# Patient Record
Sex: FEMALE | Race: BLACK OR AFRICAN AMERICAN | Employment: FULL TIME | URBAN - METROPOLITAN AREA
[De-identification: names, ages, dates, MRNs, and addresses within clinical notes are randomized per-mention and may not be internally consistent; named-entity substitution may affect disease eponyms.]

---

## 2018-01-19 ENCOUNTER — HOSPITAL ENCOUNTER (OUTPATIENT)
Dept: MAMMOGRAPHY | Age: 48
Discharge: HOME | End: 2018-01-19
Attending: FAMILY MEDICINE
Payer: COMMERCIAL

## 2018-01-19 DIAGNOSIS — Z12.31 VISIT FOR SCREENING MAMMOGRAM: ICD-10-CM

## 2018-01-19 PROCEDURE — 77067 SCR MAMMO BI INCL CAD: CPT

## 2018-07-31 ENCOUNTER — HOSPITAL ENCOUNTER (OUTPATIENT)
Dept: LAB | Age: 48
Discharge: HOME | End: 2018-07-31
Payer: COMMERCIAL

## 2018-07-31 ENCOUNTER — OFFICE VISIT (OUTPATIENT)
Dept: FAMILY MEDICINE CLINIC | Age: 48
End: 2018-07-31

## 2018-07-31 VITALS
DIASTOLIC BLOOD PRESSURE: 76 MMHG | TEMPERATURE: 97.8 F | OXYGEN SATURATION: 98 % | WEIGHT: 239.2 LBS | HEIGHT: 62 IN | SYSTOLIC BLOOD PRESSURE: 116 MMHG | HEART RATE: 71 BPM | RESPIRATION RATE: 12 BRPM | BODY MASS INDEX: 44.02 KG/M2

## 2018-07-31 DIAGNOSIS — I10 ESSENTIAL HYPERTENSION: ICD-10-CM

## 2018-07-31 DIAGNOSIS — L30.9 ECZEMA, UNSPECIFIED TYPE: ICD-10-CM

## 2018-07-31 DIAGNOSIS — Z12.4 SCREENING FOR CERVICAL CANCER: ICD-10-CM

## 2018-07-31 DIAGNOSIS — L65.9 HAIR LOSS: ICD-10-CM

## 2018-07-31 DIAGNOSIS — M25.572 CHRONIC PAIN OF BOTH ANKLES: ICD-10-CM

## 2018-07-31 DIAGNOSIS — Z91.09 ENVIRONMENTAL ALLERGIES: ICD-10-CM

## 2018-07-31 DIAGNOSIS — R60.0 BILATERAL LOWER EXTREMITY EDEMA: ICD-10-CM

## 2018-07-31 DIAGNOSIS — E66.01 MORBID OBESITY WITH BMI OF 40.0-44.9, ADULT (HCC): ICD-10-CM

## 2018-07-31 DIAGNOSIS — I10 ESSENTIAL HYPERTENSION: Primary | ICD-10-CM

## 2018-07-31 DIAGNOSIS — D64.9 MILD ANEMIA: ICD-10-CM

## 2018-07-31 DIAGNOSIS — M25.571 CHRONIC PAIN OF BOTH ANKLES: ICD-10-CM

## 2018-07-31 DIAGNOSIS — Z13.31 POSITIVE DEPRESSION SCREENING: ICD-10-CM

## 2018-07-31 DIAGNOSIS — G89.29 CHRONIC PAIN OF BOTH ANKLES: ICD-10-CM

## 2018-07-31 LAB
ALBUMIN SERPL-MCNC: 3.6 G/DL (ref 3.4–5)
ALBUMIN/GLOB SERPL: 1 {RATIO} (ref 0.8–1.7)
ALP SERPL-CCNC: 65 U/L (ref 45–117)
ALT SERPL-CCNC: 30 U/L (ref 13–56)
ANION GAP SERPL CALC-SCNC: 7 MMOL/L (ref 3–18)
AST SERPL-CCNC: 41 U/L (ref 15–37)
BASOPHILS # BLD: 0 K/UL (ref 0–0.1)
BASOPHILS NFR BLD: 1 % (ref 0–2)
BILIRUB SERPL-MCNC: 0.2 MG/DL (ref 0.2–1)
BUN SERPL-MCNC: 12 MG/DL (ref 7–18)
BUN/CREAT SERPL: 16 (ref 12–20)
CALCIUM SERPL-MCNC: 8.4 MG/DL (ref 8.5–10.1)
CHLORIDE SERPL-SCNC: 106 MMOL/L (ref 100–108)
CHOLEST SERPL-MCNC: 200 MG/DL
CO2 SERPL-SCNC: 28 MMOL/L (ref 21–32)
CREAT SERPL-MCNC: 0.73 MG/DL (ref 0.6–1.3)
DIFFERENTIAL METHOD BLD: ABNORMAL
EOSINOPHIL # BLD: 0.2 K/UL (ref 0–0.4)
EOSINOPHIL NFR BLD: 5 % (ref 0–5)
ERYTHROCYTE [DISTWIDTH] IN BLOOD BY AUTOMATED COUNT: 13.9 % (ref 11.6–14.5)
FERRITIN SERPL-MCNC: 20 NG/ML (ref 8–388)
FOLATE SERPL-MCNC: 6.2 NG/ML (ref 3.1–17.5)
GLOBULIN SER CALC-MCNC: 3.7 G/DL (ref 2–4)
GLUCOSE SERPL-MCNC: 95 MG/DL (ref 74–99)
HBA1C MFR BLD: 5.8 % (ref 4.2–5.6)
HCT VFR BLD AUTO: 36.5 % (ref 35–45)
HDLC SERPL-MCNC: 57 MG/DL (ref 40–60)
HDLC SERPL: 3.5 {RATIO} (ref 0–5)
HGB BLD-MCNC: 12 G/DL (ref 12–16)
IRON SATN MFR SERPL: 9 %
IRON SERPL-MCNC: 37 UG/DL (ref 50–175)
LDLC SERPL CALC-MCNC: 126.4 MG/DL (ref 0–100)
LIPID PROFILE,FLP: ABNORMAL
LYMPHOCYTES # BLD: 1.6 K/UL (ref 0.9–3.6)
LYMPHOCYTES NFR BLD: 40 % (ref 21–52)
MCH RBC QN AUTO: 27.5 PG (ref 24–34)
MCHC RBC AUTO-ENTMCNC: 32.9 G/DL (ref 31–37)
MCV RBC AUTO: 83.5 FL (ref 74–97)
MONOCYTES # BLD: 0.5 K/UL (ref 0.05–1.2)
MONOCYTES NFR BLD: 12 % (ref 3–10)
NEUTS SEG # BLD: 1.7 K/UL (ref 1.8–8)
NEUTS SEG NFR BLD: 42 % (ref 40–73)
PLATELET # BLD AUTO: 228 K/UL (ref 135–420)
PMV BLD AUTO: 12.1 FL (ref 9.2–11.8)
POTASSIUM SERPL-SCNC: 3.9 MMOL/L (ref 3.5–5.5)
PROT SERPL-MCNC: 7.3 G/DL (ref 6.4–8.2)
RBC # BLD AUTO: 4.37 M/UL (ref 4.2–5.3)
SODIUM SERPL-SCNC: 141 MMOL/L (ref 136–145)
TIBC SERPL-MCNC: 402 UG/DL (ref 250–450)
TRIGL SERPL-MCNC: 83 MG/DL (ref ?–150)
TSH SERPL DL<=0.05 MIU/L-ACNC: 1.9 UIU/ML (ref 0.36–3.74)
VIT B12 SERPL-MCNC: 866 PG/ML (ref 211–911)
VLDLC SERPL CALC-MCNC: 16.6 MG/DL
WBC # BLD AUTO: 4 K/UL (ref 4.6–13.2)

## 2018-07-31 PROCEDURE — 82607 VITAMIN B-12: CPT | Performed by: PHYSICIAN ASSISTANT

## 2018-07-31 PROCEDURE — 83036 HEMOGLOBIN GLYCOSYLATED A1C: CPT | Performed by: PHYSICIAN ASSISTANT

## 2018-07-31 PROCEDURE — 80053 COMPREHEN METABOLIC PANEL: CPT | Performed by: PHYSICIAN ASSISTANT

## 2018-07-31 PROCEDURE — 36415 COLL VENOUS BLD VENIPUNCTURE: CPT | Performed by: PHYSICIAN ASSISTANT

## 2018-07-31 PROCEDURE — 83540 ASSAY OF IRON: CPT | Performed by: PHYSICIAN ASSISTANT

## 2018-07-31 PROCEDURE — 80061 LIPID PANEL: CPT | Performed by: PHYSICIAN ASSISTANT

## 2018-07-31 PROCEDURE — 82728 ASSAY OF FERRITIN: CPT | Performed by: PHYSICIAN ASSISTANT

## 2018-07-31 PROCEDURE — 85025 COMPLETE CBC W/AUTO DIFF WBC: CPT | Performed by: PHYSICIAN ASSISTANT

## 2018-07-31 PROCEDURE — 84443 ASSAY THYROID STIM HORMONE: CPT | Performed by: PHYSICIAN ASSISTANT

## 2018-07-31 RX ORDER — LEVOCETIRIZINE DIHYDROCHLORIDE 5 MG/1
TABLET, FILM COATED ORAL
COMMUNITY
End: 2018-10-12

## 2018-07-31 RX ORDER — MONTELUKAST SODIUM 10 MG/1
10 TABLET ORAL DAILY
COMMUNITY
End: 2020-03-14

## 2018-07-31 RX ORDER — AMLODIPINE BESYLATE 10 MG/1
10 TABLET ORAL DAILY
COMMUNITY
Start: 2018-05-21 | End: 2018-08-02 | Stop reason: SINTOL

## 2018-07-31 RX ORDER — HYDROCORTISONE VALERATE 2 MG/G
CREAM TOPICAL DAILY
COMMUNITY
Start: 2017-08-11 | End: 2018-10-12

## 2018-07-31 RX ORDER — AZELASTINE HYDROCHLORIDE 0.5 MG/ML
SOLUTION/ DROPS OPHTHALMIC 2 TIMES DAILY
COMMUNITY
Start: 2018-05-21 | End: 2018-12-18 | Stop reason: SDUPTHER

## 2018-07-31 RX ORDER — TRIAMCINOLONE ACETONIDE 1 MG/G
CREAM TOPICAL DAILY
COMMUNITY
Start: 2017-09-05 | End: 2020-03-14

## 2018-07-31 NOTE — MR AVS SNAPSHOT
[facility] [address] 707 [facility] 
[phone] Patient: Ayesha Chavez MRN: [id number] IQI:[id number] Visit Information Date & Time Provider Department Dept. Phone Encounter #  
 7/31/2018  9:00 AM Marichuy Marvin, [address] [place] [address] [id number] Follow-up Instructions Return in about 3 months (around 10/31/2018) for Routine Care. Upcoming Health Maintenance Date Due DTaP/Tdap/Td series (1 - Tdap) 4/8/1991 PAP AKA CERVICAL CYTOLOGY 4/8/1991 Influenza Age 5 to Adult 8/1/2018 Allergies as of 7/31/2018  Review Complete On: 7/31/2018 By: SAHARA Zaman Severity Noted Reaction Type Reactions Latex  06/25/2014   Topical Hives Current Immunizations  Never Reviewed No immunizations on file. Not reviewed this visit You Were Diagnosed With   
  
 Codes Comments Essential hypertension    -  Primary ICD-10-CM: I10 
ICD-9-CM: 401.9 Environmental allergies     ICD-10-CM: Z91.09 
ICD-9-CM: V15.09 Eczema, unspecified type     ICD-10-CM: L30.9 ICD-9-CM: 692.9 Mild anemia     ICD-10-CM: D64.9 ICD-9-CM: 285.9 Hair loss     ICD-10-CM: L65.9 ICD-9-CM: 704.00 Bilateral lower extremity edema     ICD-10-CM: R60.0 ICD-9-CM: 745. 3 Chronic pain of both ankles     ICD-10-CM: M25.571, G89.29, M25.572 ICD-9-CM: 719.47, 338.29 Positive depression screening     ICD-10-CM: Z13.89 ICD-9-CM: 796.4 Morbid obesity with BMI of 40.0-44.9, adult (HCC)     ICD-10-CM: E66.01, Z68.41 
ICD-9-CM: 278.01, V85.41 Screening for cervical cancer     ICD-10-CM: Z12.4 ICD-9-CM: V76.2 Vitals BP Pulse Temp Resp Height(growth percentile) Weight(growth percentile) 116/76 (BP 1 Location: Right arm, BP Patient Position: Sitting) 71 97.8 °F (36.6 °C) (Oral) 12 5' 2\" (1.575 m) 239 lb 3.2 oz (108.5 kg) LMP SpO2 BMI OB Status Smoking Status 07/16/2018 98% 43.75 kg/m2 Unknown Never Smoker Vitals History BMI and BSA Data Body Mass Index Body Surface Area 43.75 kg/m 2 2.18 m 2 Preferred Pharmacy Pharmacy Name Phone [id number] Azar, [address] Your Updated Medication List  
  
   
This list is accurate as of 7/31/18 10:00 AM.  Always use your most recent med list.  
  
  
  
  
 ALEVE 220 mg tablet Generic drug:  naproxen sodium Take 220 mg by mouth two (2) times daily (with meals). amLODIPine 10 mg tablet Commonly known as:  Selvin Mend Take 10 mg by mouth daily. azelastine 0.05 % ophthalmic solution Commonly known as:  OPTIVAR  
two (2) times a day. hydrocortisone valerate 0.2 % topical cream  
Commonly known as:  WESTCORT  
daily. levocetirizine 5 mg tablet Commonly known as:  Jorene Ashing Take  by mouth. SINGULAIR 10 mg tablet Generic drug:  montelukast  
Take 10 mg by mouth daily. triamcinolone acetonide 0.1 % topical cream  
Commonly known as:  KENALOG Apply  to affected area daily. We Performed the Following REFERRAL TO OBSTETRICS AND GYNECOLOGY [REF51 Custom] REFERRAL TO ORTHOPEDICS [FAL784 Custom] Follow-up Instructions Return in about 3 months (around 10/31/2018) for Routine Care. To-Do List   
 07/31/2018 Lab:  HEMOGLOBIN A1C W/O EAG   
  
 07/31/2018 Lab:  IRON PROFILE   
  
 07/31/2018 Lab:  METABOLIC PANEL, COMPREHENSIVE   
  
 07/31/2018 Lab:  VITAMIN B12 & FOLATE Around 08/01/2018 Lab:  CBC WITH AUTOMATED DIFF Around 08/01/2018 Lab:  FERRITIN Around 08/01/2018 Lab:  LIPID PANEL Around 08/01/2018 Lab:  TSH 3RD GENERATION Referral Information Referral ID Referred By Referred To  
  
 [id number] Mook,  [facility], [address] [place] [address] Dr Tong [address] [facility], [address] Phone: [phone] Fax: [phone] Visits Status Start Date End Date 1 New Request 7/31/18 7/31/19 If your referral has a status of pending review or denied, additional information will be sent to support the outcome of this decision. Referral ID Referred By Referred To  
 [id number] [address], [address], [address] [place], [address] Phone: [phone] Fax: [phone] Visits Status Start Date End Date 1 New Request 7/31/18 7/31/19 If your referral has a status of pending review or denied, additional information will be sent to support the outcome of this decision. Patient Instructions A Healthy Lifestyle: Care Instructions Your Care Instructions A healthy lifestyle can help you feel good, stay at a healthy weight, and have plenty of energy for both work and play. A healthy lifestyle is something you can share with your whole family. A healthy lifestyle also can lower your risk for serious health problems, such as high blood pressure, heart disease, and diabetes. You can follow a few steps listed below to improve your health and the health of your family. Follow-up care is a key part of your treatment and safety. Be sure to make and go to all appointments, and call your doctor if you are having problems. It's also a good idea to know your test results and keep a list of the medicines you take. How can you care for yourself at home? · Do not eat too much sugar, fat, or fast foods. You can still have dessert and treats now and then. The goal is moderation. · Start small to improve your eating habits. Pay attention to portion sizes, drink less juice and soda pop, and eat more fruits and vegetables. ¨ Eat a healthy amount of food. A 3-ounce serving of meat, for example, is about the size of a deck of cards. Fill the rest of your plate with vegetables and whole grains. ¨ Limit the amount of soda and sports drinks you have every day. Drink more water when you are thirsty. ¨ Eat at least 5 servings of fruits and vegetables every day. It may seem like a lot, but it is not hard to reach this goal. A serving or helping is 1 piece of fruit, 1 cup of vegetables, or 2 cups of leafy, raw vegetables. Have an apple or some carrot sticks as an afternoon snack instead of a candy bar. Try to have fruits and/or vegetables at every meal. 
· Make exercise part of your daily routine. You may want to start with simple activities, such as walking, bicycling, or slow swimming. Try to be active 30 to 60 minutes every day. You do not need to do all 30 to 60 minutes all at once. For example, you can exercise 3 times a day for 10 or 20 minutes. Moderate exercise is safe for most people, but it is always a good idea to talk to your doctor before starting an exercise program. 
· Keep moving. Antonio Punter the lawn, work in the garden, or [place]. Take the stairs instead of the elevator at work. · If you smoke, quit. People who smoke have an increased risk for heart attack, stroke, cancer, and other lung illnesses. Quitting is hard, but there are ways to boost your chance of quitting tobacco for good. ¨ Use nicotine gum, patches, or lozenges. ¨ Ask your doctor about stop-smoking programs and medicines. ¨ Keep trying. In addition to reducing your risk of diseases in the future, you will notice some benefits soon after you stop using tobacco. If you have shortness of breath or asthma symptoms, they will likely get better within a few weeks after you quit. · Limit how much alcohol you drink. Moderate amounts of alcohol (up to 2 drinks a day for men, 1 drink a day for women) are okay. But drinking too much can lead to liver problems, high blood pressure, and other health problems. Family health If you have a family, there are many things you can do together to improve your health. · Eat meals together as a family as often as possible. · Eat healthy foods. This includes fruits, vegetables, lean meats and dairy, and whole grains. · Include your family in your fitness plan. Most people think of activities such as jogging or tennis as the way to fitness, but there are many ways you and your family can be more active. Anything that makes you breathe hard and gets your heart pumping is exercise. Here are some tips: 
¨ Walk to do errands or to take your child to school or the bus. ¨ Go for a family bike ride after dinner instead of watching TV. Where can you learn more? Go to http://moneQiandaomarshal.info/. Enter T436 in the search box to learn more about \"A Healthy Lifestyle: Care Instructions. \" Current as of: December 7, 2017 Content Version: 11.7 © 0665-3203 [employer]. Care instructions adapted under license by [employer] (which disclaims liability or warranty for this information). If you have questions about a medical condition or this instruction, always ask your healthcare professional. [address] any warranty or liability for your use of this information. Introducing [facility]! Silvana Alex introduces Dishable patient portal. Now you can access parts of your medical record, email your doctor's office, and request medication refills online. 1. In your internet browser, go to [email or web address] 2. Click on the First Time User? Click Here link in the Sign In box. You will see the New Member Sign Up page. 3. Enter your Dishable Access Code exactly as it appears below. You will not need to use this code after youve completed the sign-up process. If you do not sign up before the expiration date, you must request a new code. · Dishable Access Code: [email or web address] Expires: 10/29/2018 10:00 AM 
 
4.  Enter the last four digits of your Social Security Number (xxxx) and Date of Birth (mm/dd/yyyy) as indicated and click Submit. You will be taken to the next sign-up page. 5. Create a [employer] ID. This will be your [employer] login ID and cannot be changed, so think of one that is secure and easy to remember. 6. Create a [employer] password. You can change your password at any time. 7. Enter your Password Reset Question and Answer. This can be used at a later time if you forget your password. 8. Enter your e-mail address. You will receive e-mail notification when new information is available in [address] 9. Click Sign Up. You can now view and download portions of your medical record. 10. Click the Download Summary menu link to download a portable copy of your medical information. If you have questions, please visit the Frequently Asked Questions section of the [employer] website. Remember, [employer] is NOT to be used for urgent needs. For medical emergencies, dial 911. Now available from your iPhone and Android! Please provide this summary of care documentation to your next provider. Your primary care clinician is listed as Avelino Acharya. If you have any questions after today's visit, please call [phone].

## 2018-07-31 NOTE — PROGRESS NOTES
Chief Complaint Patient presents with Arias Establish Care  Leg Swelling 1 month, both legs  Ankle Pain  
  1 month, left leg  Hypertension Visit Vitals  /76 (BP 1 Location: Right arm, BP Patient Position: Sitting)  Pulse 71  Temp 97.8 °F (36.6 °C) (Oral)  Resp 12  Ht 5' 2\" (1.575 m)  Wt 239 lb 3.2 oz (108.5 kg)  SpO2 98%  BMI 43.75 kg/m2 Patient is not fasting. Patient in room # 6.  
 
1. Have you been to the ER, urgent care clinic since your last visit? Hospitalized since your last visit? No 
 
2. Have you seen or consulted any other health care providers outside of the [facility] since your last visit? Include any pap smears or colon screening. No 
 
HM Reviewed. Pap is due. Patient see [facility], Dr. Henry Hall for hair loss, Patient sees provider in [facility]. Last appt 06/2018, Upcominging 09/2018 Flowsheet, Learning needs and PHQ completed. PHQ+

## 2018-07-31 NOTE — PROGRESS NOTES
Patient: Jeff Ferreira MRN: [id number]  SSN: [id number] YOB: 1970  Age: 50 y.o. Sex: female Date of Service: 7/31/2018 Provider: Cheron Cowden, PA Office Location:  
[address], [address] Larry bertrand, [address] Office Phone: [phone] Office Fax: [phone] REASON FOR VISIT:  
Chief Complaint Patient presents with Ste[facility]ane [facility] Care  Leg Swelling 1 month, both legs  Ankle Pain  
  1 month, left leg  Hypertension  Alopecia VITALS:  
Visit Vitals  /76 (BP 1 Location: Right arm, BP Patient Position: Sitting) Comment: manual  
 Pulse 71  Temp 97.8 °F (36.6 °C) (Oral)  Resp 12  Ht 5' 2\" (1.575 m)  Wt 239 lb 3.2 oz (108.5 kg)  LMP 07/16/2018  SpO2 98%  BMI 43.75 kg/m2 MEDICATIONS:  
Current Outpatient Prescriptions Medication Sig Dispense Refill  amLODIPine (NORVASC) 10 mg tablet Take 10 mg by mouth daily.  hydrocortisone valerate (WESTCORT) 0.2 % topical cream daily.  azelastine (OPTIVAR) 0.05 % ophthalmic solution two (2) times a day.  triamcinolone acetonide (KENALOG) 0.1 % topical cream Apply  to affected area daily.  levocetirizine (XYZAL) 5 mg tablet Take  by mouth.  montelukast (SINGULAIR) 10 mg tablet Take 10 mg by mouth daily.  naproxen sodium (ALEVE) 220 mg tablet Take 220 mg by mouth two (2) times daily (with meals). ALLERGIES:  
Allergies Allergen Reactions  Latex Hives MEDICAL/SURGICAL HISTORY: 
Past Medical History:  
Diagnosis Date  Benign essential hypertension  Eczema Past Surgical History:  
Procedure Laterality Date  HX BREAST BIOPSY Left 6/16/2016 LEFT BREAST NEEDLE LOCALIZED BIOPSY performed by Jolanta Lord MD at [address] FAMILY HISTORY: 
Family History Problem Relation Age of Onset  No Known Problems Mother  Hypertension Father  Heart Attack Father SOCIAL HISTORY: 
Social History Substance Use Topics  Smoking status: Never Smoker  Smokeless tobacco: Never Used  Alcohol use 2.4 oz/week 1 Standard drinks or equivalent, 3 Shots of liquor per week Comment: ocassional  
  
 
 
 
HISTORY OF PRESENT ILLNESS:  
Radames Rodriguez is a 50 y.o. female who presents to the office to establish care as a new patient. Chronic Medical Problems - Hypertension -  
Relatively new diagnosis, within the past 6 months Well controlled on amlodipine 10 mg daily. Allergies - Stable on Singulair 10 mg daily and Xyzal 5 mg PRN Eczema - Stable with topical steroid creams PRN Patient does follow with dermatology (Dr. Andres Bill) Acute Concerns - 
 
B/L Leg Swelling - Patient's main reason for visit today is for evaluation of swelling in her b/l lower legs x about 1 month. 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 She notes that swelling definitely worsens with prolonged standing, but is also present when she first wakes up in the morning. She has tried compression stockings, but was unable to tolerate them. Fatigue - Patient reports she is interested in trying B12 shots to improve her energy levels. Her last blood work was done in November and did reveal a mild anemia. Left Ankle Pain - Has seen Dr. Fernanda Sharma in the past for pain in her right ankle, and did very well with cortisone injections. MRI revealed tendinopathy. Now having a similar pain in her left ankle Health Maintenance -  
Previous PCP - Dr. Maury Rucker at [facility] on [address]. Last PE with routine labs - Last pap smear - about 2 years ago, does have hx abnormal paps Last mammogram - 1/19/18, bi-rads 2. Does have history of breast bx benign breast disease/fibroadenoma Last DEXA scan - N/A Last colonoscopy - N/A Last flu shot - Last pneumonia vaccine - Last Tdap booster - REVIEW OF SYSTEMS: 
ROS (see scanned H&P form for complete 12 point ROS) PHYSICAL EXAMINATION: 
Physical Exam  
Constitutional: She is oriented to person, place, and time and well-developed, well-nourished, and in no distress. HENT:  
Head: Normocephalic and atraumatic. Eyes: Conjunctivae are normal. Pupils are equal, round, and reactive to light. Right eye exhibits no discharge. Left eye exhibits no discharge. Neck: Neck supple. No thyromegaly present. Cardiovascular: Normal rate, regular rhythm, normal heart sounds and intact distal pulses. Exam reveals no gallop and no friction rub. No murmur heard. trace pitting edema b/l ankles Pulmonary/Chest: Effort normal and breath sounds normal. She has no wheezes. She has no rales. Lymphadenopathy:  
  She has no cervical adenopathy. Neurological: She is alert and oriented to person, place, and time. Gait normal.  
Skin: Skin is warm and dry. No rash noted. Psychiatric: Mood, memory and affect normal.  
  
 
RESULTS: 
No results found for this visit on 07/31/18. ASSESSMENT/PLAN: 
Diagnoses and all orders for this visit: 1. Essential hypertension - Well controlled on current regimen, but will consider switching to HCTZ if labs are WNL as swelling could be caused by amlodipine Orders:   
-     LIPID PANEL; Future 2. Environmental allergies 
- Stable on current regimen. Med refills not needed today 3. Eczema, unspecified type - Managed by dermatology 4. Mild anemia 
- Noted on labs from 11/2017 in [facility] - Patient is interested in B12 shots, but discussed that it would be important to check a B12 level first 
Orders:   
-     CBC WITH AUTOMATED DIFF; Future 
-     IRON PROFILE; Future -     FERRITIN; Future -     VITAMIN B12 & FOLATE; Future 5. Hair loss - Followed by dermatology - Check TSH Orders:   
-     TSH 3RD GENERATION; Future 6. Bilateral lower extremity edema - Venous stasis vs. side effect of amlodipine - Check renal function and TSH  
- Plan of care to be determined depending on results Orders:   
-     TSH 3RD GENERATION; Future -     METABOLIC PANEL, COMPREHENSIVE; Future 7. Chronic pain of both ankles 
- Has done well with steroid injection in the past. Will refer back to Dr. Alvy Blizzard Orders:   
-     REFERRAL TO ORTHOPEDICS 8. Positive depression screening - Depression screen positive, patient instructed to schedule follow-up visit at this practice. 9. Morbid obesity with BMI of 40.0-44.9, adult ([address] 
- Healthy lifestyle handout included in AVS  
- Weight loss encouraged - Check labs Orders:   
-     LIPID PANEL; Future 
-     HEMOGLOBIN A1C W/O EAG; Future 10. Screening for cervical cancer - Patient prefers to see ob/gyn for well woman exams, so will order a referral for her to schedule at her earliest convenience Orders: [facility] [place] [facility] Follow-up Disposition: 
Return in about 3 months (around 10/31/2018) for Routine Care. Sooner as needed depending on lab resutls. PATIENT CARE TEAM:  
Patient Care Team: 
SAHARA Mckay as PCP - General (Physician Assistant) Gerardo Ulloa MD (Dermatology) SAHARA Mckay  
July 31, 2018 11:38 AM

## 2018-07-31 NOTE — PATIENT INSTRUCTIONS

## 2018-08-02 ENCOUNTER — TELEPHONE (OUTPATIENT)
Dept: FAMILY MEDICINE CLINIC | Age: 48
End: 2018-08-02

## 2018-08-02 RX ORDER — HYDROCHLOROTHIAZIDE 25 MG/1
25 TABLET ORAL DAILY
Qty: 30 TAB | Refills: 2 | Status: SHIPPED | OUTPATIENT
Start: 2018-08-02 | End: 2018-10-12

## 2018-08-02 NOTE — TELEPHONE ENCOUNTER
Attempted to reach patient on home/mobile number to discuss lab results. Received message stating not accepting calls at this time. No option to leave message.

## 2018-08-02 NOTE — TELEPHONE ENCOUNTER
Second attempt to reach patient on home #. Work number is just the main line for [facility].

## 2018-08-02 NOTE — LETTER
8/8/2018 11:56 AM 
 
Ms. Kenya Lai [address] [facility] [address] Dear Ms. Starr Hairston, We have been unable to reach you by phone to notify you of your test results. Please call our office at [phone] and ask to speak with my nurse in order to explain these results to you and advise you of any recommendations.  
 
 
 
Sincerely, 
 
 
SAHARA Can

## 2018-08-02 NOTE — PROGRESS NOTES
Please let patient know that her labs looked good for the most part. Blood counts, kidney, liver & thyroid were all within normal limits. Her B12 levels are normal. I do not see any need for B12 shots at this time. Iron levels are at the low end of normal. She may want to consider an OTC multivitamin that contains both iron and B12 to give her more energy. A1c was mildly elevated, in the pre-diabetic range at 5.8%. Recommend working on weight loss through diet and exercise to prevent progression to diabetes. We can discuss this in greater detail at her follow up. I believe her swelling is most likely a side effect of amlodipine. I am going to switch her BP meds to HCTZ to see if this helps.

## 2018-08-20 ENCOUNTER — TELEPHONE (OUTPATIENT)
Dept: FAMILY MEDICINE CLINIC | Age: 48
End: 2018-08-20

## 2018-08-20 NOTE — TELEPHONE ENCOUNTER
Pt states that she is returning Mindi's call about results. Advised pt that Alisia Lopez was unable to contact pt because her phone message states that it was not accepting calls at this time. Please advise.

## 2018-08-20 NOTE — TELEPHONE ENCOUNTER
Received call. Verified name and . Spoke with patient. Patient notified of results from 2018 note  per Lara SHOEMAKER. Patient understood the reason for call and had no further questions or concerns.

## 2018-10-12 ENCOUNTER — OFFICE VISIT (OUTPATIENT)
Dept: OBGYN CLINIC | Age: 48
End: 2018-10-12

## 2018-10-12 ENCOUNTER — HOSPITAL ENCOUNTER (OUTPATIENT)
Dept: LAB | Age: 48
Discharge: HOME | End: 2018-10-12
Payer: COMMERCIAL

## 2018-10-12 VITALS
WEIGHT: 238 LBS | BODY MASS INDEX: 43.79 KG/M2 | HEART RATE: 77 BPM | TEMPERATURE: 98.4 F | DIASTOLIC BLOOD PRESSURE: 80 MMHG | OXYGEN SATURATION: 100 % | SYSTOLIC BLOOD PRESSURE: 128 MMHG | HEIGHT: 62 IN

## 2018-10-12 DIAGNOSIS — Z01.419 WELL WOMAN EXAM WITH ROUTINE GYNECOLOGICAL EXAM: Primary | ICD-10-CM

## 2018-10-12 DIAGNOSIS — Z01.419 WELL WOMAN EXAM WITH ROUTINE GYNECOLOGICAL EXAM: ICD-10-CM

## 2018-10-12 DIAGNOSIS — E66.01 MORBID OBESITY WITH BMI OF 40.0-44.9, ADULT (HCC): ICD-10-CM

## 2018-10-12 DIAGNOSIS — I10 ESSENTIAL HYPERTENSION: ICD-10-CM

## 2018-10-12 DIAGNOSIS — N94.6 DYSMENORRHEA: ICD-10-CM

## 2018-10-12 PROCEDURE — 86780 TREPONEMA PALLIDUM: CPT | Performed by: OBSTETRICS & GYNECOLOGY

## 2018-10-12 PROCEDURE — 88175 CYTOPATH C/V AUTO FLUID REDO: CPT | Performed by: OBSTETRICS & GYNECOLOGY

## 2018-10-12 PROCEDURE — 87389 HIV-1 AG W/HIV-1&-2 AB AG IA: CPT | Performed by: OBSTETRICS & GYNECOLOGY

## 2018-10-12 PROCEDURE — 87624 HPV HI-RISK TYP POOLED RSLT: CPT | Performed by: OBSTETRICS & GYNECOLOGY

## 2018-10-12 PROCEDURE — 86803 HEPATITIS C AB TEST: CPT | Performed by: OBSTETRICS & GYNECOLOGY

## 2018-10-12 PROCEDURE — 87491 CHLMYD TRACH DNA AMP PROBE: CPT | Performed by: OBSTETRICS & GYNECOLOGY

## 2018-10-12 RX ORDER — IBUPROFEN 800 MG/1
800 TABLET ORAL
Qty: 60 TAB | Refills: 1 | Status: SHIPPED | OUTPATIENT
Start: 2018-10-12 | End: 2018-12-18 | Stop reason: SDUPTHER

## 2018-10-12 RX ORDER — AMLODIPINE BESYLATE 10 MG/1
TABLET ORAL DAILY
COMMUNITY
End: 2019-05-06

## 2018-10-12 RX ORDER — KETOCONAZOLE 20 MG/ML
SHAMPOO TOPICAL
COMMUNITY
Start: 2018-09-10 | End: 2019-05-06
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
## 2018-10-12 NOTE — PATIENT INSTRUCTIONS
Well Visit, Ages 25 to 48: Care Instructions Your Care Instructions Physical exams can help you stay healthy. Your doctor has checked your overall health and may have suggested ways to take good care of yourself. He or she also may have recommended tests. At home, you can help prevent illness with healthy eating, regular exercise, and other steps. Follow-up care is a key part of your treatment and safety. Be sure to make and go to all appointments, and call your doctor if you are having problems. It's also a good idea to know your test results and keep a list of the medicines you take. How can you care for yourself at home? · Reach and stay at a healthy weight. This will lower your risk for many problems, such as obesity, diabetes, heart disease, and high blood pressure. · Get at least 30 minutes of physical activity on most days of the week. Walking is a good choice. You also may want to do other activities, such as running, swimming, cycling, or playing tennis or team sports. Discuss any changes in your exercise program with your doctor. · Do not smoke or allow others to smoke around you. If you need help quitting, talk to your doctor about stop-smoking programs and medicines. These can increase your chances of quitting for good. · Talk to your doctor about whether you have any risk factors for sexually transmitted infections (STIs). Having one sex partner (who does not have STIs and does not have sex with anyone else) is a good way to avoid these infections. · Use birth control if you do not want to have children at this time. Talk with your doctor about the choices available and what might be best for you. · Protect your skin from too much sun. When you're outdoors from 10 a.m. to 4 p.m., stay in the shade or cover up with clothing and a hat with a wide brim. Wear sunglasses that block UV rays. Even when it's cloudy, put broad-spectrum sunscreen (SPF 30 or higher) on any exposed skin. · See a dentist one or two times a year for checkups and to have your teeth cleaned. · Wear a seat belt in the car. · Drink alcohol in moderation, if at all. That means no more than 2 drinks a day for men and 1 drink a day for women. Follow your doctor's advice about when to have certain tests. These tests can spot problems early. For everyone · Cholesterol. Have the fat (cholesterol) in your blood tested after age 21. Your doctor will tell you how often to have this done based on your age, family history, or other things that can increase your risk for heart disease. · Blood pressure. Have your blood pressure checked during a routine doctor visit. Your doctor will tell you how often to check your blood pressure based on your age, your blood pressure results, and other factors. · Vision. Talk with your doctor about how often to have a glaucoma test. 
· Diabetes. Ask your doctor whether you should have tests for diabetes. · Colon cancer. Have a test for colon cancer at age 48. You may have one of several tests. If you are younger than 48, you may need a test earlier if you have any risk factors. Risk factors include whether you already had a precancerous polyp removed from your colon or whether your parent, brother, sister, or child has had colon cancer. For women · Breast exam and mammogram. Talk to your doctor about when you should have a clinical breast exam and a mammogram. Medical experts differ on whether and how often women under 50 should have these tests. Your doctor can help you decide what is right for you. · Pap test and pelvic exam. Begin Pap tests at age 24. A Pap test is the best way to find cervical cancer. The test often is part of a pelvic exam. Ask how often to have this test. 
· Tests for sexually transmitted infections (STIs). Ask whether you should have tests for STIs. You may be at risk if you have sex with more than one person, especially if your partners do not wear condoms. For men · Tests for sexually transmitted infections (STIs). Ask whether you should have tests for STIs. You may be at risk if you have sex with more than one person, especially if you do not wear a condom. · Testicular cancer exam. Ask your doctor whether you should check your testicles regularly. · Prostate exam. Talk to your doctor about whether you should have a blood test (called a PSA test) for prostate cancer. Experts differ on whether and when men should have this test. Some experts suggest it if you are older than 39 and are -American or have a father or brother who got prostate cancer when he was younger than 72. When should you call for help? Watch closely for changes in your health, and be sure to contact your doctor if you have any problems or symptoms that concern you. Where can you learn more? Go to http://mone-marshal.info/. Enter P072 in the search box to learn more about \"Well Visit, Ages 25 to 48: Care Instructions. \" Current as of: March 29, 2018 Content Version: 11.8 © 8673-6049 [employer]. Care instructions adapted under license by [employer] (which disclaims liability or warranty for this information). If you have questions about a medical condition or this instruction, always ask your healthcare professional. [address] any warranty or liability for your use of this information. Body Mass Index: Care Instructions Your Care Instructions Body mass index (BMI) can help you see if your weight is raising your risk for health problems. It uses a formula to compare how much you weigh with how tall you are. · A BMI lower than 18.5 is considered underweight. · A BMI between 18.5 and 24.9 is considered healthy. · A BMI between 25 and 29.9 is considered overweight. A BMI of 30 or higher is considered obese.  
If your BMI is in the normal range, it means that you have a lower risk for weight-related health problems. If your BMI is in the overweight or obese range, you may be at increased risk for weight-related health problems, such as high blood pressure, heart disease, stroke, arthritis or joint pain, and diabetes. If your BMI is in the underweight range, you may be at increased risk for health problems such as fatigue, lower protection (immunity) against illness, muscle loss, bone loss, hair loss, and hormone problems. BMI is just one measure of your risk for weight-related health problems. You may be at higher risk for health problems if you are not active, you eat an unhealthy diet, or you drink too much alcohol or use tobacco products. Follow-up care is a key part of your treatment and safety. Be sure to make and go to all appointments, and call your doctor if you are having problems. It's also a good idea to know your test results and keep a list of the medicines you take. How can you care for yourself at home? · Practice healthy eating habits. This includes eating plenty of fruits, vegetables, whole grains, lean protein, and low-fat dairy. · If your doctor recommends it, get more exercise. Walking is a good choice. Bit by bit, increase the amount you walk every day. Try for at least 30 minutes on most days of the week. · Do not smoke. Smoking can increase your risk for health problems. If you need help quitting, talk to your doctor about stop-smoking programs and medicines. These can increase your chances of quitting for good. · Limit alcohol to 2 drinks a day for men and 1 drink a day for women. Too much alcohol can cause health problems. If you have a BMI higher than 25 · Your doctor may do other tests to check your risk for weight-related health problems. This may include measuring the distance around your waist. A waist measurement of more than 40 inches in men or 35 inches in women can increase the risk of weight-related health problems. · Talk with your doctor about steps you can take to stay healthy or improve your health. You may need to make lifestyle changes to lose weight and stay healthy, such as changing your diet and getting regular exercise. If you have a BMI lower than 18.5 · Your doctor may do other tests to check your risk for health problems. · Talk with your doctor about steps you can take to stay healthy or improve your health. You may need to make lifestyle changes to gain or maintain weight and stay healthy, such as getting more healthy foods in your diet and doing exercises to build muscle. Where can you learn more? Go to http://mone-marshal.info/. Enter S176 in the search box to learn more about \"Body Mass Index: Care Instructions. \" Current as of: June 26, 2018 Content Version: 11.8 © 0082-0338 [employer]. Care instructions adapted under license by [employer] (which disclaims liability or warranty for this information). If you have questions about a medical condition or this instruction, always ask your healthcare professional. [address] any warranty or liability for your use of this information. Starting a Weight Loss Plan: Care Instructions Your Care Instructions If you are thinking about losing weight, it can be hard to know where to start. Your doctor can help you set up a weight loss plan that best meets your needs. You may want to take a class on nutrition or exercise, or join a weight loss support group. If you have questions about how to make changes to your eating or exercise habits, ask your doctor about seeing a registered dietitian or an exercise specialist. 
It can be a big challenge to lose weight. But you do not have to make huge changes at once. Make small changes, and stick with them. When those changes become habit, add a few more changes.  
If you do not think you are ready to make changes right now, try to pick a date in the future. Make an appointment to see your doctor to discuss whether the time is right for you to start a plan. Follow-up care is a key part of your treatment and safety. Be sure to make and go to all appointments, and call your doctor if you are having problems. It's also a good idea to know your test results and keep a list of the medicines you take. How can you care for yourself at home? · Set realistic goals. Many people expect to lose much more weight than is likely. A weight loss of 5% to 10% of your body weight may be enough to improve your health. · Get family and friends involved to provide support. Talk to them about why you are trying to lose weight, and ask them to help. They can help by participating in exercise and having meals with you, even if they may be eating something different. · Find what works best for you. If you do not have time or do not like to cook, a program that offers meal replacement bars or shakes may be better for you. Or if you like to prepare meals, finding a plan that includes daily menus and recipes may be best. 
· Ask your doctor about other health professionals who can help you achieve your weight loss goals. ¨ A dietitian can help you make healthy changes in your diet. ¨ An exercise specialist or  can help you develop a safe and effective exercise program. 
¨ A counselor or psychiatrist can help you cope with issues such as depression, anxiety, or family problems that can make it hard to focus on weight loss. · Consider joining a support group for people who are trying to lose weight. Your doctor can suggest groups in your area. Where can you learn more? Go to http://mone-marshal.info/. Enter F008 in the search box to learn more about \"Starting a Weight Loss Plan: Care Instructions. \" Current as of: June 26, 2018 Content Version: 11.8 © 5390-5135 [employer].  Care instructions adapted under license by [address] (which disclaims liability or warranty for this information). If you have questions about a medical condition or this instruction, always ask your healthcare professional. [address] any warranty or liability for your use of this information.

## 2018-10-12 NOTE — MR AVS SNAPSHOT
William Lin 
 
 
 [address], [address] [place] [address] 
[phone] Patient: Toni Hernandez MRN: [id number] XOC:[id number] Visit Information Date & Time Provider Department Dept. Phone Encounter #  
 10/12/2018 11:30 AM Jessica Llamas [id number] Follow-up Instructions Return in about 2 months (around 12/12/2018) for follow up. Your Appointments 10/31/2018  9:30 AM  
FOLLOW UP EXAM with SAHARA Abbott [address] [place] [address] ([facility]) Appt Note: 3 mth f/u  
 [address] [place] [address]  
  
    
 11/9/2018 11:15 AM  
Office Visit with Nidia Frances MD  
[facility] ([facility]) Appt Note: follow up  
 [address], [address] [address] [facility] [address]  
[phone]  
  
   
 [address], [address] [place] [facility] Upcoming Health Maintenance Date Due  
 PAP AKA CERVICAL CYTOLOGY 4/8/1991 Influenza Age 5 to Adult 8/1/2018 Allergies as of 10/12/2018  Review Complete On: 10/12/2018 By: Nidia Frances MD  
  
 Severity Noted Reaction Type Reactions Latex  06/25/2014   Topical Hives Current Immunizations  Never Reviewed No immunizations on file. Not reviewed this visit You Were Diagnosed With   
  
 Codes Comments Well woman exam with routine gynecological exam    -  Primary ICD-10-CM: H17.093 ICD-9-CM: V72.31 Morbid obesity with BMI of 40.0-44.9, adult (HCC)     ICD-10-CM: E66.01, Z68.41 
ICD-9-CM: 278.01, V85.41 Essential hypertension     ICD-10-CM: I10 
ICD-9-CM: 401.9 Dysmenorrhea     ICD-10-CM: N94.6 ICD-9-CM: 386. 3 Vitals  BP Pulse Temp Height(growth percentile) Weight(growth percentile) LMP  
 128/80 77 98.4 °F (36.9 °C) (Oral) 5' 2\" (1.575 m) 238 lb (108 kg) 09/12/2018 SpO2 BMI OB Status Smoking Status 100% 43.53 kg/m2 Unknown Never Smoker BMI and BSA Data Body Mass Index Body Surface Area  
 43.53 kg/m 2 2.17 m 2 Preferred Pharmacy Pharmacy Name Phone [id number], [address] [place] [address] Your Updated Medication List  
  
   
This list is accurate as of 10/12/18 12:50 PM.  Always use your most recent med list.  
  
  
  
  
 ALEVE 220 mg tablet Generic drug:  naproxen sodium Take 220 mg by mouth two (2) times daily (with meals). amLODIPine 10 mg tablet Commonly known as:  Rocio Greening Take  by mouth daily. azelastine 0.05 % ophthalmic solution Commonly known as:  OPTIVAR  
two (2) times a day.  
  
 ketoconazole 2 % shampoo Commonly known as:  NIZORAL  
USE EVERY 3 DAYS FOR 14 DAYS. SHAMPOO, LATHER, RINSE AFTER 5 MINUTES. CAN REPEAT ONCE A DAY  
  
 SINGULAIR 10 mg tablet Generic drug:  montelukast  
Take 10 mg by mouth daily. triamcinolone acetonide 0.1 % topical cream  
Commonly known as:  KENALOG Apply  to affected area daily. Follow-up Instructions Return in about 2 months (around 12/12/2018) for follow up. Patient Instructions Well Visit, Ages 25 to 48: Care Instructions Your Care Instructions Physical exams can help you stay healthy. Your doctor has checked your overall health and may have suggested ways to take good care of yourself. He or she also may have recommended tests. At home, you can help prevent illness with healthy eating, regular exercise, and other steps. Follow-up care is a key part of your treatment and safety. Be sure to make and go to all appointments, and call your doctor if you are having problems. It's also a good idea to know your test results and keep a list of the medicines you take. How can you care for yourself at home? · Reach and stay at a healthy weight. This will lower your risk for many problems, such as obesity, diabetes, heart disease, and high blood pressure. · Get at least 30 minutes of physical activity on most days of the week. Walking is a good choice. You also may want to do other activities, such as running, swimming, cycling, or playing tennis or team sports. Discuss any changes in your exercise program with your doctor. · Do not smoke or allow others to smoke around you. If you need help quitting, talk to your doctor about stop-smoking programs and medicines. These can increase your chances of quitting for good. · Talk to your doctor about whether you have any risk factors for sexually transmitted infections (STIs). Having one sex partner (who does not have STIs and does not have sex with anyone else) is a good way to avoid these infections. · Use birth control if you do not want to have children at this time. Talk with your doctor about the choices available and what might be best for you. · Protect your skin from too much sun. When you're outdoors from 10 a.m. to 4 p.m., stay in the shade or cover up with clothing and a hat with a wide brim. Wear sunglasses that block UV rays. Even when it's cloudy, put broad-spectrum sunscreen (SPF 30 or higher) on any exposed skin. · See a dentist one or two times a year for checkups and to have your teeth cleaned. · Wear a seat belt in the car. · Drink alcohol in moderation, if at all. That means no more than 2 drinks a day for men and 1 drink a day for women. Follow your doctor's advice about when to have certain tests. These tests can spot problems early. For everyone · Cholesterol. Have the fat (cholesterol) in your blood tested after age 21. Your doctor will tell you how often to have this done based on your age, family history, or other things that can increase your risk for heart disease. · Blood pressure. Have your blood pressure checked during a routine doctor visit. Your doctor will tell you how often to check your blood pressure based on your age, your blood pressure results, and other factors. · Vision. Talk with your doctor about how often to have a glaucoma test. 
· Diabetes. Ask your doctor whether you should have tests for diabetes. · Colon cancer. Have a test for colon cancer at age 48. You may have one of several tests. If you are younger than 48, you may need a test earlier if you have any risk factors. Risk factors include whether you already had a precancerous polyp removed from your colon or whether your parent, brother, sister, or child has had colon cancer. For women · Breast exam and mammogram. Talk to your doctor about when you should have a clinical breast exam and a mammogram. Medical experts differ on whether and how often women under 50 should have these tests. Your doctor can help you decide what is right for you. · Pap test and pelvic exam. Begin Pap tests at age 24. A Pap test is the best way to find cervical cancer. The test often is part of a pelvic exam. Ask how often to have this test. 
· Tests for sexually transmitted infections (STIs). Ask whether you should have tests for STIs. You may be at risk if you have sex with more than one person, especially if your partners do not wear condoms. For men · Tests for sexually transmitted infections (STIs). Ask whether you should have tests for STIs. You may be at risk if you have sex with more than one person, especially if you do not wear a condom. · Testicular cancer exam. Ask your doctor whether you should check your testicles regularly. · Prostate exam. Talk to your doctor about whether you should have a blood test (called a PSA test) for prostate cancer.  Experts differ on whether and when men should have this test. Some experts suggest it if you are older than 39 and are -American or have a father or brother who got prostate cancer when he was younger than 72. When should you call for help? Watch closely for changes in your health, and be sure to contact your doctor if you have any problems or symptoms that concern you. Where can you learn more? Go to http://mone-marshal.info/. Enter P072 in the search box to learn more about \"Well Visit, Ages 25 to 48: Care Instructions. \" Current as of: March 29, 2018 Content Version: 11.8 © 6202-8055 [employer]rl. Care instructions adapted under license by [employer] (which disclaims liability or warranty for this information). If you have questions about a medical condition or this instruction, always ask your healthcare professional. [address] any warranty or liability for your use of this information. Body Mass Index: Care Instructions Your Care Instructions Body mass index (BMI) can help you see if your weight is raising your risk for health problems. It uses a formula to compare how much you weigh with how tall you are. · A BMI lower than 18.5 is considered underweight. · A BMI between 18.5 and 24.9 is considered healthy. · A BMI between 25 and 29.9 is considered overweight. A BMI of 30 or higher is considered obese. If your BMI is in the normal range, it means that you have a lower risk for weight-related health problems. If your BMI is in the overweight or obese range, you may be at increased risk for weight-related health problems, such as high blood pressure, heart disease, stroke, arthritis or joint pain, and diabetes. If your BMI is in the underweight range, you may be at increased risk for health problems such as fatigue, lower protection (immunity) against illness, muscle loss, bone loss, hair loss, and hormone problems. BMI is just one measure of your risk for weight-related health problems. You may be at higher risk for health problems if you are not active, you eat an unhealthy diet, or you drink too much alcohol or use tobacco products. Follow-up care is a key part of your treatment and safety. Be sure to make and go to all appointments, and call your doctor if you are having problems. It's also a good idea to know your test results and keep a list of the medicines you take. How can you care for yourself at home? · Practice healthy eating habits. This includes eating plenty of fruits, vegetables, whole grains, lean protein, and low-fat dairy. · If your doctor recommends it, get more exercise. Walking is a good choice. Bit by bit, increase the amount you walk every day. Try for at least 30 minutes on most days of the week. · Do not smoke. Smoking can increase your risk for health problems. If you need help quitting, talk to your doctor about stop-smoking programs and medicines. These can increase your chances of quitting for good. · Limit alcohol to 2 drinks a day for men and 1 drink a day for women. Too much alcohol can cause health problems. If you have a BMI higher than 25 · Your doctor may do other tests to check your risk for weight-related health problems. This may include measuring the distance around your waist. A waist measurement of more than 40 inches in men or 35 inches in women can increase the risk of weight-related health problems. · Talk with your doctor about steps you can take to stay healthy or improve your health. You may need to make lifestyle changes to lose weight and stay healthy, such as changing your diet and getting regular exercise. If you have a BMI lower than 18.5 · Your doctor may do other tests to check your risk for health problems. · Talk with your doctor about steps you can take to stay healthy or improve your health.  You may need to make lifestyle changes to gain or maintain weight and stay healthy, such as getting more healthy foods in your diet and doing exercises to build muscle. Where can you learn more? Go to http://mone-marshal.info/. Enter S176 in the search box to learn more about \"Body Mass Index: Care Instructions. \" Current as of: June 26, 2018 Content Version: 11.8 © 1963-6228 [employer]. Care instructions adapted under license by [employer] (which disclaims liability or warranty for this information). If you have questions about a medical condition or this instruction, always ask your healthcare professional. [address] any warranty or liability for your use of this information. Starting a Weight Loss Plan: Care Instructions Your Care Instructions If you are thinking about losing weight, it can be hard to know where to start. Your doctor can help you set up a weight loss plan that best meets your needs. You may want to take a class on nutrition or exercise, or join a weight loss support group. If you have questions about how to make changes to your eating or exercise habits, ask your doctor about seeing a registered dietitian or an exercise specialist. 
It can be a big challenge to lose weight. But you do not have to make huge changes at once. Make small changes, and stick with them. When those changes become habit, add a few more changes. If you do not think you are ready to make changes right now, try to pick a date in the future. Make an appointment to see your doctor to discuss whether the time is right for you to start a plan. Follow-up care is a key part of your treatment and safety. Be sure to make and go to all appointments, and call your doctor if you are having problems. It's also a good idea to know your test results and keep a list of the medicines you take. How can you care for yourself at home? · Set realistic goals.  Many people expect to lose much more weight than is likely. A weight loss of 5% to 10% of your body weight may be enough to improve your health. · Get family and friends involved to provide support. Talk to them about why you are trying to lose weight, and ask them to help. They can help by participating in exercise and having meals with you, even if they may be eating something different. · Find what works best for you. If you do not have time or do not like to cook, a program that offers meal replacement bars or shakes may be better for you. Or if you like to prepare meals, finding a plan that includes daily menus and recipes may be best. 
· Ask your doctor about other health professionals who can help you achieve your weight loss goals. ¨ A dietitian can help you make healthy changes in your diet. ¨ An exercise specialist or  can help you develop a safe and effective exercise program. 
¨ A counselor or psychiatrist can help you cope with issues such as depression, anxiety, or family problems that can make it hard to focus on weight loss. · Consider joining a support group for people who are trying to lose weight. Your doctor can suggest groups in your area. Where can you learn more? Go to http://mone-marshal.info/. Enter C213 in the search box to learn more about \"Starting a Weight Loss Plan: Care Instructions. \" Current as of: June 26, 2018 Content Version: 11.8 © 7071-9567 Healthwise, Incorporated. Care instructions adapted under license by [employer] (which disclaims liability or warranty for this information). If you have questions about a medical condition or this instruction, always ask your healthcare professional. [place] [address] any warranty or liability for your use of this information. Introducing [facility]!    
 [facility] introduces Loginza patient portal. Now you can access parts of your medical record, email your doctor's office, and request medication refills online. 1. In your internet browser, go to [email or web address] [email or web address] 2. Click on the First Time User? Click Here link in the Sign In box. You will see the New Member Sign Up page. 3. Enter your ivWatch Access Code exactly as it appears below. You will not need to use this code after youve completed the sign-up process. If you do not sign up before the expiration date, you must request a new code. · ivWatch Access Code: [email or web address] Expires: 10/29/2018 10:00 AM 
 
4. Enter the last four digits of your Social Security Number (xxxx) and Date of Birth (mm/dd/yyyy) as indicated and click Submit. You will be taken to the next sign-up page. 5. Create a ivWatch ID. This will be your ivWatch login ID and cannot be changed, so think of one that is secure and easy to remember. 6. Create a ivWatch password. You can change your password at any time. 7. Enter your Password Reset Question and Answer. This can be used at a later time if you forget your password. 8. Enter your e-mail address. You will receive e-mail notification when new information is available in [address] 9. Click Sign Up. You can now view and download portions of your medical record. 10. Click the Download Summary menu link to download a portable copy of your medical information. If you have questions, please visit the Frequently Asked Questions section of the ivWatch website. Remember, ivWatch is NOT to be used for urgent needs. For medical emergencies, dial 911. Now available from your iPhone and Android! Please provide this summary of care documentation to your next provider. Your primary care clinician is listed as Vamshi Lindo. If you have any questions after today's visit, please call [phone].

## 2018-10-13 LAB — T PALLIDUM AB SER QL IA: NONREACTIVE

## 2018-10-15 LAB
C TRACH RRNA SPEC QL NAA+PROBE: NEGATIVE
HCV AB SER IA-ACNC: 0.07 INDEX
HCV AB SERPL QL IA: NEGATIVE
HCV COMMENT,HCGAC: NORMAL
HIV 1+2 AB+HIV1 P24 AG SERPL QL IA: NONREACTIVE
HIV12 RESULT COMMENT, HHIVC: NORMAL
N GONORRHOEA RRNA SPEC QL NAA+PROBE: NEGATIVE
SPECIMEN SOURCE: NORMAL
T VAGINALIS RRNA SPEC QL NAA+PROBE: NEGATIVE

## 2018-10-19 NOTE — PROGRESS NOTES
Please contact pt to let her know that her Pap and STI testing is negative but her pap had endometrial cells on it. We need to do further work up so please see if we can move her follow up appt up , thanks!

## 2018-10-24 ENCOUNTER — TELEPHONE (OUTPATIENT)
Dept: OBGYN CLINIC | Age: 48
End: 2018-10-24

## 2018-10-25 NOTE — TELEPHONE ENCOUNTER
Placed call to patient. Patient identified using name and . Lab results reviewed with patient. Patient has follow-up appointment on  @ 1115. Per Dr. Marquez Edu' note the patient should move up the follow up appointment. Patient would like a call further explaining her lab results. Is  sufficient for follow up or does she need a sooner appointment?

## 2018-10-30 ENCOUNTER — OFFICE VISIT (OUTPATIENT)
Dept: OBGYN CLINIC | Age: 48
End: 2018-10-30

## 2018-10-30 VITALS
OXYGEN SATURATION: 100 % | BODY MASS INDEX: 44.09 KG/M2 | HEIGHT: 62 IN | TEMPERATURE: 97.8 F | WEIGHT: 239.6 LBS | HEART RATE: 67 BPM | DIASTOLIC BLOOD PRESSURE: 80 MMHG | RESPIRATION RATE: 18 BRPM | SYSTOLIC BLOOD PRESSURE: 143 MMHG

## 2018-10-30 DIAGNOSIS — R87.619 ENDOMETRIAL CELLS ON CERVICAL PAP SMEAR INCONSISTENT W/LMP: Primary | ICD-10-CM

## 2018-10-30 DIAGNOSIS — N94.6 DYSMENORRHEA: ICD-10-CM

## 2018-10-30 DIAGNOSIS — I10 ESSENTIAL HYPERTENSION: ICD-10-CM

## 2018-10-30 DIAGNOSIS — E66.01 MORBID OBESITY WITH BMI OF 40.0-44.9, ADULT (HCC): ICD-10-CM

## 2018-10-30 NOTE — PROGRESS NOTES
Name: Eric Boyd MRN: [id number] [phone] YOB: 1970  Age: 50 y.o. Sex: female Chief Complaint Patient presents with  Labs HPI 1. Well woman exam with routine gynecological exam follow up 
  
- PAP IG, CT-NG-TV, APTIMA HPV AND RFX  Neg, HR HPV neg, endometrial cells present, not having any abnormal bleeding between her periods, notes that her last period was really heavy, using tubal for birth control - T PALLIDUM neg 
- HIV 1/2 neg 
- HEPATITIS C neg 
  
2. Morbid obesity with BMI of 40.0-44.9, adult ([facility] [address] Reviewed diet and exercise, noted that she is prediabetic, recommended she see her PCP 
  
3. Essential hypertension On meds 
  
4. Dysmenorrhea Tried the ibuprofen which she found helpful OB History  Para Term  AB Living 3 3 3     3 SAB TAB Ectopic Molar Multiple Live Births 3  
   
 Obstetric Comments Patient's last menstrual period was 2018. Periods regular, last 3-7 days, flow medium, moderate dysmenorrhea History of sexually transmitted infections gonorrhea Last pap unsure PGyn Social History Substance and Sexual Activity Sexual Activity Yes  Partners: Male Past Medical History:  
Diagnosis Date  Benign essential hypertension  Eczema  Environmental allergies Past Surgical History:  
Procedure Laterality Date [address] Allergies Allergen Reactions  Latex Hives Current Outpatient Medications on File Prior to Visit Medication Sig Dispense Refill  amLODIPine (NORVASC) 10 mg tablet Take  by mouth daily.  ibuprofen (MOTRIN) 800 mg tablet Take 1 Tab by mouth every eight (8) hours as needed for Pain. 60 Tab 1  
 azelastine (OPTIVAR) 0.05 % ophthalmic solution two (2) times a day.  montelukast (SINGULAIR) 10 mg tablet Take 10 mg by mouth daily.  ketoconazole (NIZORAL) 2 % shampoo USE EVERY 3 DAYS FOR 14 DAYS. SHAMPOO, LATHER, RINSE AFTER 5 MINUTES. CAN REPEAT ONCE A DAY  triamcinolone acetonide (KENALOG) 0.1 % topical cream Apply  to affected area daily. No current facility-administered medications on file prior to visit. Review of Systems Constitutional: Negative. Gastrointestinal: Negative. Genitourinary: Negative. Visit Vitals /80 (BP 1 Location: Left arm, BP Patient Position: Sitting) Pulse 67 Temp 97.8 °F (36.6 °C) (Oral) Resp 18 Ht 5' 2\" (1.575 m) Wt 239 lb 9.6 oz (108.7 kg) SpO2 100% BMI 43.82 kg/m² GENERAL:  Well developed, well nourished, in no distress No results found for this or any previous visit (from the past 24 hour(s)). ICD-10-CM ICD-9-CM 1. Endometrial cells on cervical Pap smear inconsistent w/LMP R87.619 795.00  
2. Morbid obesity with BMI of 40.0-44.9, adult ([facility] [address] E66.01 278.01  
 Z68.41 V85.41  
3. Essential hypertension I10 401.9 4. Dysmenorrhea N94.6 625.3 1. Endometrial cells on cervical Pap smear inconsistent w/LMP Reviewed with patient that since she had endometrial cells on her Pap smear before her cycle, she needs an endometrial biopsy. Discussed that she has additional risk factors for endometrial hyperplasia and endometrial cancer primarily due to her excess weight. Discussed endometrial biopsy in the office versus hysteroscopy in the hospital.  Risk benefits and alternatives to both discussed. Patient prefers to have office biopsy but does not prefer to have it done today. Will schedule. 2. Morbid obesity with BMI of 40.0-44.9, adult ([address] Risk factor for endometrial cancer and hyperplasia. 3. Essential hypertension Blood pressure mildly elevated today 4. Dysmenorrhea Improved with ibuprofen F/U 2-3 weeks for endometrial biopsy

## 2018-10-30 NOTE — PATIENT INSTRUCTIONS
Endometrial Biopsy: About This Test 
What is it? An endometrial biopsy is a way for your doctor to take a small sample of the lining of the uterus (endometrium). The sample is looked at under a microscope for abnormal cells. An endometrial biopsy helps your doctor find problems in the endometrium. Why is this test done? An endometrial biopsy is done to check for cancer of the uterus. The test is also done if you have abnormal bleeding from your uterus or are having problems getting pregnant. The test results show how your body's hormones are affecting the lining of the uterus. How can you prepare for the test? 
Talk to your doctor about all your health conditions before the test. For example, tell your doctor if you: · Are or might be pregnant. An endometrial biopsy is not done during pregnancy. · Are taking any medicines. · Are allergic to any medicines. · Have had bleeding problems, or if you take aspirin or some other blood thinner. · Have been treated for an infection in your pelvic area. · Have any heart or lung problems. Other ways to prepare: · Do not douche, use tampons, or use vaginal medicines for 24 hours before the test. 
· Ask your doctor if you should take a pain reliever, such as ibuprofen (Advil or Motrin), 30 to 60 minutes before the test. This can help reduce any cramping pain that the test can cause. · Talk to your doctor if you have concerns about the need for the test, its risks, how it will be done, or what the results may mean. What happens before the test? 
· You will empty your bladder just before the test. 
· You will be asked to sign a consent form that says you understand the risks of the test and agree to have it done. What happens during the test? 
· You will lie on an exam table. Your feet will be in stirrups. · The doctor may use a spray or injection to numb your cervix. The cervix is the opening to the uterus. · The doctor will use a tool called a speculum to see the cervix. · Then the doctor will pass a thin tube through the cervix to take a sample of the uterus lining. You may feel a sharp cramp when the doctor collects the sample. · The sample is sent to a lab. How long does the test take? The test will take about 5 to 15 minutes. What happens after the test? 
· You will probably be able to go home right away. · You likely will have mild vaginal bleeding and may have cramps for a few days after the test. The cramps may feel like bad menstrual cramps. · Ask your doctor if you can take an over-the-counter pain medicine, such as acetaminophen (Tylenol), ibuprofen (Advil, Motrin), or naproxen (Aleve). Be safe with medicines. Read and follow all instructions on the label. · Do not have sex, use tampons, or douche until the spotting stops. Use pads for vaginal bleeding or discharge. · Do not do strenuous exercise or heavy lifting for one day after your biopsy. Follow-up care is a key part of your treatment and safety. Be sure to make and go to all appointments, and call your doctor if you are having problems. It's also a good idea to keep a list of the medicines you take. Ask your doctor when you can expect to have your test results. Where can you learn more? Go to http://mone-marshal.info/. Enter [phone] in the search box to learn more about \"Endometrial Biopsy: About This Test.\" Current as of: May 15, 2018 Content Version: 11.8 © 9161-1584 Healthwise, Incorporated. Care instructions adapted under license by [employer] (which disclaims liability or warranty for this information). If you have questions about a medical condition or this instruction, always ask your healthcare professional. [address] any warranty or liability for your use of this information. Hysteroscopy: Before Your Procedure What is a hysteroscopy? A hysteroscopy is a procedure to find and treat problems with your uterus. It may be done to remove growths from the uterus. Or it may be done to diagnose or treat fertility problems. It can also be done to diagnose or treat abnormal bleeding. The doctor will guide a lighted tube through the cervix and into the uterus. This tube is called a hysteroscope, or scope. It lets your doctor see inside your body. The doctor will fill your uterus with air or liquid. This makes it easier to see the inside of your uterus with the scope. The doctor may also put tools through the scope to treat a problem. During this procedure, the doctor may take out a small piece of tissue for study. This is called a biopsy. Or the doctor may gently scrape tissue from the inner wall of the uterus. This is called a dilation and curettage, or D&C. If your doctor filled your uterus with liquid, most it will flow out when the scope is removed. You will most likely go home the same day. Many women are able to go back to work the next day. But it depends on what was done and the type of work you do. Follow-up care is a key part of your treatment and safety. Be sure to make and go to all appointments, and call your doctor if you are having problems. It's also a good idea to know your test results and keep a list of the medicines you take. What happens before the procedure? 
 Preparing for the procedure 
  · Understand exactly what procedure is planned, along with the risks, benefits, and other options. · Tell your doctors ALL the medicines, vitamins, supplements, and herbal remedies you take. Some of these can increase the risk of bleeding or interact with anesthesia.  
  · If you take aspirin or some other blood thinner, be sure to talk to your doctor. He or she will tell you if you should stop taking it before your procedure. Make sure that you understand exactly what your doctor wants you to do.   · Your doctor will tell you which medicines to take or stop before your procedure. You may need to stop taking certain medicines a week or more before the procedure. So talk to your doctor as soon as you can.  
  · If you have an advance directive, let your doctor know. It may include a living will and a durable power of  for health care. Bring a copy to the hospital. If you don't have one, you may want to prepare one. It lets your doctor and loved ones know your health care wishes. Doctors advise that everyone prepare these papers before any type of surgery or procedure. Procedures can be stressful. This information will help you understand what you can expect. And it will help you safely prepare for your procedure. What happens on the day of the procedure? · Follow the instructions exactly about when to stop eating and drinking. If you don't, your procedure may be canceled. If your doctor has instructed you to take your medicines on the day of the procedure, take them with only a sip of water.  
  · Take a bath or shower before you come in for your procedure. Do not apply lotions, perfumes, deodorants, or nail polish.  
  · Take off all jewelry and piercings. And take out contact lenses, if you wear them.  
 At the hospital or surgery center · Bring a picture ID.  
  · You will be kept comfortable and safe by your anesthesia provider. The anesthesia may make you sleep. Or it may just numb the area being worked on.  
  · The procedure usually takes less than 30 minutes. Going home · Be sure you have someone to drive you home. Anesthesia and pain medicine make it unsafe for you to drive.  
  · You will be given more specific instructions about recovering from your procedure. They will cover things like diet, wound care, follow-up care, driving, and getting back to your normal routine. When should you call your doctor? · You have questions or concerns.   · You don't understand how to prepare for your procedure.  
  · You become ill before the procedure (such as fever, flu, or a cold).  
  · You need to reschedule or have changed your mind about having the procedure. Where can you learn more? Go to http://mone-marshal.info/. Enter P588 in the search box to learn more about \"Hysteroscopy: Before Your Procedure. \" Current as of: May 15, 2018 Content Version: 11.8 © 9433-7131 [employer]. Care instructions adapted under license by [employer] (which disclaims liability or warranty for this information). If you have questions about a medical condition or this instruction, always ask your healthcare professional. [address] any warranty or liability for your use of this information. Hysteroscopy: What to Expect at [facility] Your Recovery A hysteroscopy is a procedure to find and treat problems with your uterus. It may have been done to remove growths from the uterus. Or it may have been done to diagnose or treat fertility problems. It can also be used to diagnose or treat abnormal bleeding. If the doctor filled your uterus with air, you may have gas pains or your belly may feel full. You may have cramps and light vaginal bleeding for a few days to several weeks. The cramps and bleeding may last longer if the hysteroscopy was used for treatment. You may also have shoulder pain right after the procedure. If the doctor filled your uterus with liquid, you may have watery vaginal discharge for several weeks. This care sheet gives you a general idea about how long it will take for you to recover. But each person recovers at a different pace. Follow the steps below to get better as quickly as possible. How can you care for yourself at home? Activity 
  · Rest when you feel tired.  
  · You can do your normal activities when it feels okay to do so.   · You may shower and take baths as usual.  
  · Ask your doctor when it is okay for you to have sex. Diet 
  · You can eat your normal diet. If your stomach is upset, try bland, low-fat foods like plain rice, broiled chicken, toast, and yogurt.  
  · If your bowel movements are not regular right after surgery, try to avoid constipation and straining. Drink plenty of water. Your doctor may suggest fiber, a stool softener, or a mild laxative. Medicines 
  · Your doctor will tell you if and when you can restart your medicines. He or she will also give you instructions about taking any new medicines.  
  · If you take aspirin or some other blood thinner, be sure to talk to your doctor. He or she will tell you if and when to start taking this medicine again. Make sure that you understand exactly what your doctor wants you to do.  
  · Be safe with medicines. Read and follow all instructions on the label. ? If the doctor gave you a prescription medicine for pain, take it as prescribed. ? If you are not taking a prescription pain medicine, ask your doctor if you can take an over-the-counter medicine.  
  · If your doctor prescribed antibiotics, take them as directed. Do not stop taking them just because you feel better. You need to take the full course of antibiotics. Other instructions 
  · You may have some light vaginal bleeding. Wear sanitary pads if needed. Do not use tampons until your doctor says it is okay.  
  · You may want to use a heating pad on your belly to help with pain. Use a low heat setting.  
  · Talk about birth control with your doctor. Do not try to become pregnant until your doctor says it is okay. Follow-up care is a key part of your treatment and safety. Be sure to make and go to all appointments, and call your doctor if you are having problems. It's also a good idea to know your test results and keep a list of the medicines you take. When should you call for help? Call 911 anytime you think you may need emergency care. For example, call if: 
  · You passed out (lost consciousness).  
  · You have chest pain, are short of breath, or cough up blood.  
 Call your doctor now or seek immediate medical care if: 
  · You have pain that does not get better after you take pain medicine.  
  · You cannot pass stools or gas.  
  · You have vaginal discharge that has increased in amount or smells bad.  
  · You are sick to your stomach or cannot drink fluids.  
  · You have signs of infection, such as: 
? Increased pain, swelling, warmth, or redness. ? A fever.  
  · You have bright red vaginal bleeding that soaks one or more pads in an hour, or you have large clots.  
  · You have signs of a blood clot in your leg (called a deep vein thrombosis), such as: 
? Pain in your calf, back of the knee, thigh, or groin. ? Redness and swelling in your leg.  
 Watch closely for changes in your health, and be sure to contact your doctor if you have any problems. Where can you learn more? Go to http://mone-marshal.info/. Enter I360 in the search box to learn more about \"Hysteroscopy: What to Expect at Home. \" Current as of: May 15, 2018 Content Version: 11.8 © 4491-1400 Healthwise, Incorporated. Care instructions adapted under license by [employer] (which disclaims liability or warranty for this information). If you have questions about a medical condition or this instruction, always ask your healthcare professional. [place] [address] any warranty or liability for your use of this information. Learning About Endometrial Hyperplasia What is endometrial hyperplasia? Endometrial hyperplasia means that the lining (endometrium) of the uterus becomes too thick. It usually causes abnormal uterine bleeding. It's more common in women who are close to or in menopause. The uterus is where a fetus grows during pregnancy.  It's a pear-shaped organ in a woman's lower belly. Abnormal bleeding includes: · Bleeding more than usual. 
· Bleeding more often. · Bleeding that doesn't occur at your regular time. · Any bleeding after menopause. This condition can lead to cancer in some cases. Your doctor will keep an eye on the condition to see if the cells keep changing. He or she may suggest treatments such as surgery or hormones. What causes it? Hormones that are out of balance can cause problems with the lining of the uterus. If you have too much of the hormone estrogen compared to progesterone, the lining can start to thicken. This can affect your menstrual cycle. (A regular cycle usually helps keep the lining of the uterus thin.) Your risk for this condition may be higher if you have: · Polycystic ovary syndrome. · Obesity, diabetes, or late menopause. · Not given birth to any children. · Tamoxifen treatment for breast cancer. How is it diagnosed? If you have abnormal vaginal bleeding, your doctor may do some tests to check the lining of your uterus. You may have a transvaginal, or pelvic, ultrasound. This test uses sound waves to make a picture of the uterus. It can measure the thickness of the lining. You will also have a biopsy. Your doctor will take a small sample of the lining of the uterus and look at it under a microscope. The doctor will look for certain changes in the cells. How is it treated? Treatment for endometrial hyperplasia may include: · Watching for changes in the lining of the uterus over time. · Taking the hormone progestin. · Having surgery to remove the uterus (hysterectomy). In some cases, surgery to remove the ovaries and fallopian tubes may also be done. The treatment depends on whether the cells in the uterine lining have changed. It also depends on whether you want to have children in the future. Follow-up care is a key part of your treatment and safety.  Be sure to make and go to all appointments, and call your doctor if you are having problems. It's also a good idea to know your test results and keep a list of the medicines you take. Where can you learn more? Go to http://mone-marshal.info/. Enter X935 in the search box to learn more about \"Learning About Endometrial Hyperplasia. \" Current as of: September 28, 2017 Content Version: 11.8 © 5648-7738 Healthwise, [employer]. Care instructions adapted under license by [employer] (which disclaims liability or warranty for this information). If you have questions about a medical condition or this instruction, always ask your healthcare professional. [address] any warranty or liability for your use of this information.

## 2018-12-05 ENCOUNTER — HOSPITAL ENCOUNTER (OUTPATIENT)
Dept: LAB | Age: 48
Discharge: HOME | End: 2018-12-05
Payer: COMMERCIAL

## 2018-12-05 ENCOUNTER — OFFICE VISIT (OUTPATIENT)
Dept: OBGYN CLINIC | Age: 48
End: 2018-12-05

## 2018-12-05 VITALS
SYSTOLIC BLOOD PRESSURE: 140 MMHG | HEIGHT: 62 IN | WEIGHT: 244.8 LBS | DIASTOLIC BLOOD PRESSURE: 80 MMHG | TEMPERATURE: 97.3 F | BODY MASS INDEX: 45.05 KG/M2 | HEART RATE: 78 BPM | OXYGEN SATURATION: 99 %

## 2018-12-05 DIAGNOSIS — E66.01 MORBID OBESITY WITH BMI OF 40.0-44.9, ADULT (HCC): ICD-10-CM

## 2018-12-05 DIAGNOSIS — R87.619 ENDOMETRIAL CELLS ON CERVICAL PAP SMEAR INCONSISTENT W/LMP: Primary | ICD-10-CM

## 2018-12-05 DIAGNOSIS — N94.6 DYSMENORRHEA: ICD-10-CM

## 2018-12-05 DIAGNOSIS — I10 ESSENTIAL HYPERTENSION: ICD-10-CM

## 2018-12-05 LAB
HCG URINE, QL. (POC): NEGATIVE
VALID INTERNAL CONTROL?: YES

## 2018-12-05 PROCEDURE — 88305 TISSUE EXAM BY PATHOLOGIST: CPT

## 2018-12-05 NOTE — PROGRESS NOTES
Pre-op Dx: Endometrial cells on pap Post-op Dx: Endometrial cells on pap Procedure: Endometrial Bx 
 
Procedure note:  After procedure reviewed with patient, speculum placed vaginally. The cervix was wiped with betadine and the grasped with a single tooth tenaculum. A pipelle was placed without difficulty with adequate tissue return x 3. Sounded to 8 cm. Single tooth tenaculum was removed and the tenaculum sites were made hemostatic with silver nitrate. Pt tolerated the procedure well. 1. Endometrial cells on cervical Pap smear inconsistent w/LMP Reviewed R/B/A to office bx vs hysteroscopy, pt fine with office bx done today. 2. Morbid obesity with BMI of 40.0-44.9, adult ([address] Risk factor for hyperplasia/endometrial bx 3. Essential hypertension 4. Dysmenorrhea Ibuprofen given at last visit, will readdress at follow up F/U 2-3 weeks for results Recent Results (from the past 12 hour(s)) AMB POC URINE PREGNANCY TEST, VISUAL COLOR COMPARISON Collection Time: 12/05/18 10:15 AM  
Result Value Ref Range VALID INTERNAL CONTROL POC Yes HCG urine, Ql. (POC) Negative Negative

## 2018-12-05 NOTE — PATIENT INSTRUCTIONS
Endometrial Biopsy: About This Test 
What is it? An endometrial biopsy is a way for your doctor to take a small sample of the lining of the uterus (endometrium). The sample is looked at under a microscope for abnormal cells. An endometrial biopsy helps your doctor find problems in the endometrium. Why is this test done? An endometrial biopsy is done to check for cancer of the uterus. The test is also done if you have abnormal bleeding from your uterus or are having problems getting pregnant. The test results show how your body's hormones are affecting the lining of the uterus. How can you prepare for the test? 
Talk to your doctor about all your health conditions before the test. For example, tell your doctor if you: · Are or might be pregnant. An endometrial biopsy is not done during pregnancy. · Are taking any medicines. · Are allergic to any medicines. · Have had bleeding problems, or if you take aspirin or some other blood thinner. · Have been treated for an infection in your pelvic area. · Have any heart or lung problems. Other ways to prepare: · Do not douche, use tampons, or use vaginal medicines for 24 hours before the test. 
· Ask your doctor if you should take a pain reliever, such as ibuprofen (Advil or Motrin), 30 to 60 minutes before the test. This can help reduce any cramping pain that the test can cause. · Talk to your doctor if you have concerns about the need for the test, its risks, how it will be done, or what the results may mean. What happens before the test? 
· You will empty your bladder just before the test. 
· You will be asked to sign a consent form that says you understand the risks of the test and agree to have it done. What happens during the test? 
· You will lie on an exam table. Your feet will be in stirrups. · The doctor may use a spray or injection to numb your cervix. The cervix is the opening to the uterus. · The doctor will use a tool called a speculum to see the cervix. · Then the doctor will pass a thin tube through the cervix to take a sample of the uterus lining. You may feel a sharp cramp when the doctor collects the sample. · The sample is sent to a lab. How long does the test take? The test will take about 5 to 15 minutes. What happens after the test? 
· You will probably be able to go home right away. · You likely will have mild vaginal bleeding and may have cramps for a few days after the test. The cramps may feel like bad menstrual cramps. · Ask your doctor if you can take an over-the-counter pain medicine, such as acetaminophen (Tylenol), ibuprofen (Advil, Motrin), or naproxen (Aleve). Be safe with medicines. Read and follow all instructions on the label. · Do not have sex, use tampons, or douche until the spotting stops. Use pads for vaginal bleeding or discharge. · Do not do strenuous exercise or heavy lifting for one day after your biopsy. Follow-up care is a key part of your treatment and safety. Be sure to make and go to all appointments, and call your doctor if you are having problems. It's also a good idea to keep a list of the medicines you take. Ask your doctor when you can expect to have your test results. Where can you learn more? Go to http://mone-marshal.info/. Enter [phone] in the search box to learn more about \"Endometrial Biopsy: About This Test.\" Current as of: May 15, 2018 Content Version: 11.8 © 8889-6129 [employer]. Care instructions adapted under license by [employer] (which disclaims liability or warranty for this information). If you have questions about a medical condition or this instruction, always ask your healthcare professional. [address] any warranty or liability for your use of this information. Painful Menstrual Cramps: Care Instructions Your Care Instructions Painful menstrual cramps are very common. Many women go to the doctor because of bad cramps when they get their period. You may have cramps in your back, thighs, and belly. You may also have diarrhea, constipation, or nausea. Some women also get dizzy. Pain medicine and home treatment can help you feel better. Follow-up care is a key part of your treatment and safety. Be sure to make and go to all appointments, and call your doctor if you are having problems. It's also a good idea to know your test results and keep a list of the medicines you take. How can you care for yourself at home? · Take anti-inflammatory medicines for pain. Ibuprofen (Advil, Motrin) and naproxen (Aleve) usually work better than aspirin. ? Be safe with medicines. Talk to your doctor or pharmacist before you take any of these medicines. They may not be safe if you take other medicines or have other health problems. ? Start taking the recommended dose of pain medicine as soon as you start to feel pain. Or you can start on the day before your period. Keep taking the medicine for as many days as you have cramps. ? If anti-inflammatory medicines don't help, try acetaminophen (Tylenol). ? Do not take two or more pain medicines at the same time unless the doctor told you to. Many pain medicines have acetaminophen, which is Tylenol. Too much acetaminophen (Tylenol) can be harmful. ? Read and follow all instructions on the label. · Put a heating pad set on low or a hot water bottle on your belly. Or take a warm bath. Heat improves blood flow and may help with pain. · Lie down and put a pillow under your knees. Or lie on your side and bring your knees up to your chest. This will help with any back pressure. · Get at least 30 minutes of exercise on most days of the week. This improves blood flow and may decrease pain. Walking is a good choice.  You also may want to do other activities, such as running, swimming, cycling, or playing tennis or team sports. When should you call for help? Call your doctor now or seek immediate medical care if: 
  · You have new or worse belly or pelvic pain.  
  · You have severe vaginal bleeding.  
 Watch closely for changes in your health, and be sure to contact your doctor if: 
  · You have unusual vaginal bleeding.  
  · You do not get better as expected. Where can you learn more? Go to http://mone-marshal.info/. Enter [id number] in the search box to learn more about \"Painful Menstrual Cramps: Care Instructions. \" Current as of: May 15, 2018 Content Version: 11.8 © 2964-7300 [employer]. Care instructions adapted under license by [employer] (which disclaims liability or warranty for this information). If you have questions about a medical condition or this instruction, always ask your healthcare professional. [address] any warranty or liability for your use of this information.

## 2018-12-10 ENCOUNTER — TELEPHONE (OUTPATIENT)
Dept: OBGYN CLINIC | Age: 48
End: 2018-12-10

## 2018-12-10 NOTE — TELEPHONE ENCOUNTER
Called patient to follow-up as it states patient cancelled her upcoming appointment, patient will speak to  and reschedule. Transferred to .

## 2018-12-10 NOTE — TELEPHONE ENCOUNTER
----- Message from Raven Agudelo MD sent at 12/9/2018  9:51 AM EST ----- Please make sure pt has a follow up visit to review, thanks!

## 2018-12-18 ENCOUNTER — OFFICE VISIT (OUTPATIENT)
Dept: OBGYN CLINIC | Age: 48
End: 2018-12-18

## 2018-12-18 VITALS
HEART RATE: 80 BPM | OXYGEN SATURATION: 98 % | SYSTOLIC BLOOD PRESSURE: 136 MMHG | DIASTOLIC BLOOD PRESSURE: 66 MMHG | WEIGHT: 243.4 LBS | BODY MASS INDEX: 44.79 KG/M2 | TEMPERATURE: 98 F | HEIGHT: 62 IN

## 2018-12-18 DIAGNOSIS — I10 ESSENTIAL HYPERTENSION: ICD-10-CM

## 2018-12-18 DIAGNOSIS — N94.6 DYSMENORRHEA: Primary | ICD-10-CM

## 2018-12-18 DIAGNOSIS — E66.01 MORBID OBESITY WITH BMI OF 40.0-44.9, ADULT (HCC): ICD-10-CM

## 2018-12-18 DIAGNOSIS — R87.619 ENDOMETRIAL CELLS ON CERVICAL PAP SMEAR INCONSISTENT W/LMP: ICD-10-CM

## 2018-12-18 RX ORDER — AZELASTINE HYDROCHLORIDE 0.5 MG/ML
SOLUTION/ DROPS OPHTHALMIC
COMMUNITY
Start: 2018-05-21 | End: 2019-05-06

## 2018-12-18 RX ORDER — IBUPROFEN 800 MG/1
800 TABLET ORAL
Qty: 60 TAB | Refills: 3 | Status: SHIPPED | OUTPATIENT
Start: 2018-12-18 | End: 2019-05-06 | Stop reason: SDUPTHER

## 2018-12-18 NOTE — PATIENT INSTRUCTIONS
Painful Menstrual Cramps: Care Instructions  Your Care Instructions    Painful menstrual cramps are very common. Many women go to the doctor because of bad cramps when they get their period. You may have cramps in your back, thighs, and belly. You may also have diarrhea, constipation, or nausea. Some women also get dizzy. Pain medicine and home treatment can help you feel better. Follow-up care is a key part of your treatment and safety. Be sure to make and go to all appointments, and call your doctor if you are having problems. It's also a good idea to know your test results and keep a list of the medicines you take. How can you care for yourself at home? · Take anti-inflammatory medicines for pain. Ibuprofen (Advil, Motrin) and naproxen (Aleve) usually work better than aspirin. ? Be safe with medicines. Talk to your doctor or pharmacist before you take any of these medicines. They may not be safe if you take other medicines or have other health problems. ? Start taking the recommended dose of pain medicine as soon as you start to feel pain. Or you can start on the day before your period. Keep taking the medicine for as many days as you have cramps. ? If anti-inflammatory medicines don't help, try acetaminophen (Tylenol). ? Do not take two or more pain medicines at the same time unless the doctor told you to. Many pain medicines have acetaminophen, which is Tylenol. Too much acetaminophen (Tylenol) can be harmful. ? Read and follow all instructions on the label. · Put a heating pad set on low or a hot water bottle on your belly. Or take a warm bath. Heat improves blood flow and may help with pain. · Lie down and put a pillow under your knees. Or lie on your side and bring your knees up to your chest. This will help with any back pressure. · Get at least 30 minutes of exercise on most days of the week. This improves blood flow and may decrease pain. Walking is a good choice.  You also may want to do other activities, such as running, swimming, cycling, or playing tennis or team sports. When should you call for help? Call your doctor now or seek immediate medical care if:    · You have new or worse belly or pelvic pain.     · You have severe vaginal bleeding.    Watch closely for changes in your health, and be sure to contact your doctor if:    · You have unusual vaginal bleeding.     · You do not get better as expected. Where can you learn more? Go to http://mone-marshal.info/. Enter [id number] in the search box to learn more about \"Painful Menstrual Cramps: Care Instructions. \"  Current as of: May 15, 2018  Content Version: 11.8  © 7641-1682 Healthwise, [employer]. Care instructions adapted under license by [employer] (which disclaims liability or warranty for this information). If you have questions about a medical condition or this instruction, always ask your healthcare professional. [address] any warranty or liability for your use of this information.

## 2018-12-18 NOTE — PROGRESS NOTES
Name: Prem Sen MRN: [id number]    YOB: 1970  Age: 50 y.o. Sex: female        Chief Complaint   Patient presents with    Follow-up       HPI     1. Endometrial cells on cervical Pap smear inconsistent w/LMP    Endometrial bx done at last visit     ENDOMETRIUM, [address] [place] [address]. NO HYPERPLASIA OR CARCINOMA IDENTIFIED.      2. Morbid obesity with BMI of 40.0-44.9, adult (HCC)  Risk factor for hyperplasia/endometrial bx     3. Essential hypertension        4. Dysmenorrhea  Note that when she has her cramping, the ibuprofen is helpful, usually takes it twice a day when she is on her period        OB History      Para Term  AB Living    3 3 3     3    SAB TAB Ectopic Molar Multiple Live Births              3        Obstetric Comments      Patient's last menstrual period was 2018. Periods regular, last 3-7 days, flow medium, moderate dysmenorrhea  History of sexually transmitted infections gonorrhea  Last pap 10/2018 neg, HR HPV neg, endometrial cells present             PGyn    Social History     Substance and Sexual Activity   Sexual Activity Yes    Partners: Male         Past Medical History:   Diagnosis Date    Benign essential hypertension     Eczema     Environmental allergies        Past Surgical History:   Procedure Laterality Date    HX BREAST BIOPSY Left 2016    LEFT BREAST NEEDLE LOCALIZED BIOPSY performed by Beth Roper MD at [place] [address]       Allergies   Allergen Reactions    Latex Hives       Current Outpatient Medications on File Prior to Visit   Medication Sig Dispense Refill    azelastine (OPTIVAR) 0.05 % ophthalmic solution Instill 1 Drop in eye Twice Daily.  ketoconazole (NIZORAL) 2 % shampoo USE EVERY 3 DAYS FOR 14 DAYS. SHAMPOO, LATHER, RINSE AFTER 5 MINUTES. CAN REPEAT ONCE A DAY      amLODIPine (NORVASC) 10 mg tablet Take  by mouth daily.       ibuprofen (MOTRIN) 800 mg tablet Take 1 Tab by mouth every eight (8) hours as needed for Pain. 60 Tab 1    triamcinolone acetonide (KENALOG) 0.1 % topical cream Apply  to affected area daily.  montelukast (SINGULAIR) 10 mg tablet Take 10 mg by mouth daily. No current facility-administered medications on file prior to visit. Review of Systems   Constitutional: Negative. Gastrointestinal: Negative. Genitourinary: Negative. Visit Vitals  /66   Pulse 80   Temp 98 °F (36.7 °C) (Oral)   Ht 5' 2\" (1.575 m)   Wt 243 lb 6.4 oz (110.4 kg)   LMP 11/28/2018   SpO2 98%   BMI 44.52 kg/m²       GENERAL:  Well developed, well nourished, in no distress        No results found for this or any previous visit (from the past 24 hour(s)). ICD-10-CM ICD-9-CM   1. Dysmenorrhea N94.6 625.3   2. Endometrial cells on cervical Pap smear inconsistent w/LMP R87.619 795.00   3. Morbid obesity with BMI of 40.0-44.9, adult ([facility] E66.01 278.01    Z68.41 V85.41   4. Essential hypertension I10 401.9       1. Dysmenorrhea  Notes that her cramping is doing better, would like a refill, reviewed how to use for crampig    - ibuprofen (MOTRIN) 800 mg tablet; Take 1 Tab by mouth every eight (8) hours as needed for Pain. Dispense: 60 Tab; Refill: 3    2. Endometrial cells on cervical Pap smear inconsistent w/LMP  Benign pathology    3. Morbid obesity with BMI of 40.0-44.9, adult ([facility])  Risk factor for hyperplasia/endometrial bx    4. Essential hypertension  No estrogen containing products        Follow-up Disposition:  Return as needed.

## 2019-05-06 ENCOUNTER — OFFICE VISIT (OUTPATIENT)
Dept: FAMILY MEDICINE CLINIC | Age: 49
End: 2019-05-06

## 2019-05-06 VITALS
SYSTOLIC BLOOD PRESSURE: 134 MMHG | BODY MASS INDEX: 46.38 KG/M2 | WEIGHT: 252 LBS | OXYGEN SATURATION: 98 % | DIASTOLIC BLOOD PRESSURE: 78 MMHG | HEART RATE: 80 BPM | TEMPERATURE: 97.6 F | HEIGHT: 62 IN | RESPIRATION RATE: 16 BRPM

## 2019-05-06 DIAGNOSIS — N94.6 DYSMENORRHEA: ICD-10-CM

## 2019-05-06 DIAGNOSIS — I10 ESSENTIAL HYPERTENSION: Primary | ICD-10-CM

## 2019-05-06 DIAGNOSIS — E66.01 MORBID OBESITY WITH BMI OF 40.0-44.9, ADULT (HCC): ICD-10-CM

## 2019-05-06 DIAGNOSIS — Z12.39 SCREENING FOR BREAST CANCER: ICD-10-CM

## 2019-05-06 DIAGNOSIS — R60.0 LOWER EXTREMITY EDEMA: ICD-10-CM

## 2019-05-06 RX ORDER — AMLODIPINE BESYLATE 10 MG/1
10 TABLET ORAL DAILY
Qty: 30 TAB | Refills: 0 | Status: CANCELLED | OUTPATIENT
Start: 2019-05-06

## 2019-05-06 RX ORDER — IBUPROFEN 800 MG/1
800 TABLET ORAL
Qty: 60 TAB | Refills: 3 | Status: CANCELLED | OUTPATIENT
Start: 2019-05-06

## 2019-05-06 RX ORDER — HYDROCHLOROTHIAZIDE 25 MG/1
25 TABLET ORAL DAILY
Qty: 30 TAB | Refills: 2 | Status: SHIPPED | OUTPATIENT
Start: 2019-05-06 | End: 2019-08-29 | Stop reason: SDUPTHER

## 2019-05-06 RX ORDER — IBUPROFEN 800 MG/1
800 TABLET ORAL
Qty: 90 TAB | Refills: 0 | Status: SHIPPED | OUTPATIENT
Start: 2019-05-06 | End: 2019-08-29 | Stop reason: SDUPTHER

## 2019-05-06 RX ORDER — KETOCONAZOLE 20 MG/ML
SHAMPOO TOPICAL
Status: CANCELLED | OUTPATIENT
Start: 2019-05-06

## 2019-05-06 NOTE — PROGRESS NOTES
Patient: Kenya Gastelum MRN: [id number]  SSN: [id number] YOB: 1970  Age: 52 y.o. Sex: female Date of Service: 5/6/2019 Provider: SAHAAR Can Office Location:  
[place] [address], [address] [place], [address] Office Phone: [phone] Office Fax: [phone] REASON FOR VISIT:  
Chief Complaint Patient presents with  Swelling  
  bilat leg and bilat foot swelling x 2 weeks  Fall  
  fall on 5/04/19  Dizziness  Medication Refill  
  needs refill on Amlodipine - ran out of BP med three days ago VITALS:  
Visit Vitals /78 (BP 1 Location: Left arm, BP Patient Position: Sitting) Pulse 80 Temp 97.6 °F (36.4 °C) (Oral) Resp 16 Ht 5' 2\" (1.575 m) Wt 252 lb (114.3 kg) SpO2 98% BMI 46.09 kg/m² MEDICATIONS:  
Current Outpatient Medications on File Prior to Visit Medication Sig Dispense Refill  ibuprofen (MOTRIN) 800 mg tablet Take 1 Tab by mouth every eight (8) hours as needed for Pain. 60 Tab 3  
 triamcinolone acetonide (KENALOG) 0.1 % topical cream Apply  to affected area daily.  montelukast (SINGULAIR) 10 mg tablet Take 10 mg by mouth daily.  amLODIPine (NORVASC) 10 mg tablet Take  by mouth daily. No current facility-administered medications on file prior to visit. ALLERGIES:  
Allergies Allergen Reactions  Latex Hives MEDICAL/SURGICAL HISTORY: 
Past Medical History:  
Diagnosis Date  Benign essential hypertension  Eczema  Environmental allergies Past Surgical History:  
Procedure Laterality Date  HX BREAST BIOPSY Left 6/16/2016 LEFT BREAST NEEDLE LOCALIZED BIOPSY performed by Jonathan Hodge MD at [address] [place] [address] FAMILY HISTORY: 
Family History Problem Relation Age of Onset  Cancer Mother   
     unsure what kind  Hypertension Father  Heart Attack Father SOCIAL HISTORY: 
Social History Tobacco Use  Smoking status: Never Smoker  Smokeless tobacco: Never Used Substance Use Topics  Alcohol use: Yes Alcohol/week: 1.2 - 1.8 oz Types: 1 Glasses of wine, 1 - 2 Standard drinks or equivalent per week Comment: 2-3 times per week  Drug use: No  
   
 
 
 
HISTORY OF PRESENT ILLNESS: Abhi العلي is a 52 y.o. female who presents to the office for a routine follow up visit. She was last seen in July 2018 and has since been lost to follow up.  
  
Hypertension -  
Here today as a walk-in requesting a refill of her amlodipine. Patient was previously on amlodipine 10 mg daily for HTN. This was changed to HCTZ due to complaints of lower extremity swelling. However, patient states that when she went to the pharmacy to  the rx, it was not there. Thus, she never started the HCTZ. Her gynecologist had since been refilling her amlodipine. Today, patient continues to complain of swelling in her feet and lower legs, and reports that it is getting worse. She denies any associated chest pain or SOB. REVIEW OF SYSTEMS: 
Review of Systems Constitutional: Negative for chills, fever and malaise/fatigue. Eyes: Negative for blurred vision and double vision. Respiratory: Negative for cough, shortness of breath and wheezing. Cardiovascular: Positive for leg swelling. Negative for chest pain and palpitations. Neurological: Positive for dizziness ( just this morning, hasn't eaten anything yet). Negative for headaches. PHYSICAL EXAMINATION: 
Physical Exam  
Constitutional: She is oriented to person, place, and time and well-developed, well-nourished, and in no distress. Cardiovascular: Normal rate, regular rhythm, normal heart sounds and intact distal pulses. Exam reveals no gallop and no friction rub. No murmur heard. 1+ non-pitting edema b/l feet and ankles Pulmonary/Chest: Effort normal and breath sounds normal. She has no wheezes. She has no rales. Neurological: She is alert and oriented to person, place, and time. Gait normal.  
Skin: Skin is warm and dry. No rash noted. Psychiatric: Mood, memory and affect normal.  
  
 
RESULTS: 
No results found for this visit on 05/06/19. ASSESSMENT/PLAN: 
Diagnoses and all orders for this visit: 1. Essential hypertension - BP stable today off amlodipine for a few days - Will plan to switch to HCTZ as we discussed - Counseled on low sodium diet, exercise and weight loss  
- re-check fasting labs asap 
- return in a few months for re-check -     METABOLIC PANEL, COMPREHENSIVE; Future -     LIPID PANEL; Future 
-     HEMOGLOBIN A1C W/O EAG; Future -     ibuprofen (MOTRIN) 800 mg tablet; Take 1 Tab by mouth every eight (8) hours as needed for Pain. 2. Morbid obesity with BMI of 40.0-44.9, adult ([facility] Orders: -     METABOLIC PANEL, COMPREHENSIVE; Future -     LIPID PANEL; Future 
-     HEMOGLOBIN A1C W/O EAG; Future 3. Dysmenorrhea - Ibuprofen refilled 
- continue to f/u with ob/gyn for routine care Orders:   
-     ibuprofen (MOTRIN) 800 mg tablet; Take 1 Tab by mouth every eight (8) hours as needed for Pain. 4. Lower extremity edema Orders:   
-     hydroCHLOROthiazide (HYDRODIURIL) 25 mg tablet; Take 1 Tab by mouth daily. 5. Screening for breast cancer - Screening mammogram ordered at patient's request  
Orders:   
-     BASSEM MAMMO BI SCREENING INCL CAD; Future Follow up in 3 months for routine care All questions answered. Patient expresses understanding and agrees with the plan as detailed above. PATIENT CARE TEAM:  
Patient Care Team: SAHARA Fisher as PCP - General (Physician Assistant) Dayanara Brown MD (Dermatology) SAHARA Montemayor May 6, 2019  
9:50 AM

## 2019-05-06 NOTE — PROGRESS NOTES
1. Have you been to the ER, urgent care clinic since your last visit? Hospitalized since your last visit? No 
  
2. Have you seen or consulted any other health care providers outside of the [address] [place] [address] since your last visit? Include any pap smears or colon screening. No 
 
Chief Complaint Patient presents with  Swelling  
  bilat leg and bilat foot swelling x 2 weeks  Fall  
  fall on 5/04/19  Dizziness  Medication Refill  
  needs refill on Amlodipine - ran out of BP med three days ago

## 2019-05-06 NOTE — LETTER
NOTIFICATION RETURN TO WORK  
 
5/6/2019 9:40 AM 
 
Ms. Ky Peck [address] [place] [id number] To Whom It May Concern: 
 
Ky Peck is currently under the care of [facility]. She will return to work on 5/7/19. If there are questions or concerns please have the patient contact our office.  
 
 
 
Sincerely, 
 
 
 
SAHARA Khan

## 2019-05-15 ENCOUNTER — HOSPITAL ENCOUNTER (OUTPATIENT)
Dept: LAB | Age: 49
Discharge: HOME | End: 2019-05-15
Payer: COMMERCIAL

## 2019-05-15 DIAGNOSIS — E66.01 MORBID OBESITY WITH BMI OF 40.0-44.9, ADULT (HCC): ICD-10-CM

## 2019-05-15 DIAGNOSIS — I10 ESSENTIAL HYPERTENSION: ICD-10-CM

## 2019-05-15 LAB
ALBUMIN SERPL-MCNC: 3.7 G/DL (ref 3.4–5)
ALBUMIN/GLOB SERPL: 1 {RATIO} (ref 0.8–1.7)
ALP SERPL-CCNC: 62 U/L (ref 45–117)
ALT SERPL-CCNC: 27 U/L (ref 13–56)
ANION GAP SERPL CALC-SCNC: 6 MMOL/L (ref 3–18)
AST SERPL-CCNC: 15 U/L (ref 15–37)
BILIRUB SERPL-MCNC: 0.2 MG/DL (ref 0.2–1)
BUN SERPL-MCNC: 16 MG/DL (ref 7–18)
BUN/CREAT SERPL: 21 (ref 12–20)
CALCIUM SERPL-MCNC: 9 MG/DL (ref 8.5–10.1)
CHLORIDE SERPL-SCNC: 104 MMOL/L (ref 100–108)
CHOLEST SERPL-MCNC: 237 MG/DL
CO2 SERPL-SCNC: 31 MMOL/L (ref 21–32)
CREAT SERPL-MCNC: 0.75 MG/DL (ref 0.6–1.3)
GLOBULIN SER CALC-MCNC: 3.8 G/DL (ref 2–4)
GLUCOSE SERPL-MCNC: 103 MG/DL (ref 74–99)
HBA1C MFR BLD: 5.7 % (ref 4.2–5.6)
HDLC SERPL-MCNC: 62 MG/DL (ref 40–60)
HDLC SERPL: 3.8 {RATIO} (ref 0–5)
LDLC SERPL CALC-MCNC: 153.8 MG/DL (ref 0–100)
LIPID PROFILE,FLP: ABNORMAL
POTASSIUM SERPL-SCNC: 3.8 MMOL/L (ref 3.5–5.5)
PROT SERPL-MCNC: 7.5 G/DL (ref 6.4–8.2)
SODIUM SERPL-SCNC: 141 MMOL/L (ref 136–145)
TRIGL SERPL-MCNC: 106 MG/DL (ref ?–150)
VLDLC SERPL CALC-MCNC: 21.2 MG/DL

## 2019-05-15 PROCEDURE — 36415 COLL VENOUS BLD VENIPUNCTURE: CPT

## 2019-05-15 PROCEDURE — 80061 LIPID PANEL: CPT

## 2019-05-15 PROCEDURE — 83036 HEMOGLOBIN GLYCOSYLATED A1C: CPT

## 2019-05-15 PROCEDURE — 80053 COMPREHEN METABOLIC PANEL: CPT

## 2019-05-31 ENCOUNTER — HOSPITAL ENCOUNTER (OUTPATIENT)
Dept: MAMMOGRAPHY | Age: 49
Discharge: HOME | End: 2019-05-31
Attending: PHYSICIAN ASSISTANT
Payer: COMMERCIAL

## 2019-05-31 DIAGNOSIS — Z12.31 VISIT FOR SCREENING MAMMOGRAM: ICD-10-CM

## 2019-05-31 PROCEDURE — 77063 BREAST TOMOSYNTHESIS BI: CPT

## 2019-08-29 ENCOUNTER — OFFICE VISIT (OUTPATIENT)
Dept: FAMILY MEDICINE CLINIC | Age: 49
End: 2019-08-29

## 2019-08-29 VITALS
WEIGHT: 248 LBS | BODY MASS INDEX: 45.64 KG/M2 | TEMPERATURE: 98.4 F | DIASTOLIC BLOOD PRESSURE: 75 MMHG | HEIGHT: 62 IN | HEART RATE: 78 BPM | OXYGEN SATURATION: 98 % | RESPIRATION RATE: 16 BRPM | SYSTOLIC BLOOD PRESSURE: 134 MMHG

## 2019-08-29 DIAGNOSIS — N94.6 DYSMENORRHEA: ICD-10-CM

## 2019-08-29 DIAGNOSIS — M79.662 PAIN OF LEFT CALF: ICD-10-CM

## 2019-08-29 DIAGNOSIS — E66.01 MORBID OBESITY WITH BMI OF 40.0-44.9, ADULT (HCC): ICD-10-CM

## 2019-08-29 DIAGNOSIS — I10 ESSENTIAL HYPERTENSION: Primary | ICD-10-CM

## 2019-08-29 DIAGNOSIS — R73.01 IMPAIRED FASTING GLUCOSE: ICD-10-CM

## 2019-08-29 DIAGNOSIS — R60.0 LOWER EXTREMITY EDEMA: ICD-10-CM

## 2019-08-29 RX ORDER — HYDROCHLOROTHIAZIDE 25 MG/1
25 TABLET ORAL DAILY
Qty: 90 TAB | Refills: 1 | Status: SHIPPED | OUTPATIENT
Start: 2019-08-29 | End: 2020-10-21 | Stop reason: SDUPTHER

## 2019-08-29 RX ORDER — IBUPROFEN 800 MG/1
800 TABLET ORAL
Qty: 90 TAB | Refills: 2 | Status: SHIPPED | OUTPATIENT
Start: 2019-08-29 | End: 2019-12-14 | Stop reason: SDUPTHER

## 2019-08-29 RX ORDER — HYDROCHLOROTHIAZIDE 25 MG/1
25 TABLET ORAL DAILY
Qty: 30 TAB | Refills: 5 | Status: SHIPPED | OUTPATIENT
Start: 2019-08-29 | End: 2019-08-29 | Stop reason: SDUPTHER

## 2019-08-29 NOTE — PATIENT INSTRUCTIONS
Kegel Exercises: Care Instructions  Your Care Instructions    Kegel exercises strengthen muscles around the bladder. These muscles control the flow of urine. Kegel exercises are sometime called \"pelvic floor\" exercises. They can help prevent urine leakage and keep the pelvic organs in place. A woman who just had a baby might want to try Kegel exercises. They can strengthen pelvic muscles that have been weakened by pregnancy and childbirth. A man or woman may use Kegel exercises to treat urine leakage. You do Kegel exercises by tightening the muscles you use when you urinate. You will likely need to do these exercises for several weeks to get better. Follow-up care is a key part of your treatment and safety. Be sure to make and go to all appointments, and call your doctor if you are having problems. It's also a good idea to know your test results and keep a list of the medicines you take. How can you care for yourself at home? · Do Kegel exercises. ? Find the muscles you need to strengthen. To do this, tighten the muscles that stop your urine while you are going to the bathroom. These are the same muscles you squeeze during Kegel exercises. ? Squeeze the muscles as hard as you can. Your belly and thighs should not move. ? Hold the squeeze for 3 seconds. Then relax for 3 seconds. ? Start with 3 seconds, and then add 1 second each week until you are able to squeeze for 10 seconds. ? Repeat the exercise 10 to 15 times for each session. Do three or more sessions each day. · You can check to see if you are using the right muscles. Place a finger in your vagina and squeeze around it. You are doing them right when you feel pressure around your finger. Your doctor may also suggest that you put special weights in your vagina while you do the exercises. · Do not smoke. It can irritate the bladder. If you need help quitting, talk to your doctor about stop-smoking programs and medicines.  These can increase your chances of quitting for good. Where can you learn more? Go to http://mone-marshal.info/. Enter S957 in the search box to learn more about \"Kegel Exercises: Care Instructions. \"  Current as of: December 19, 2018  Content Version: 12.1  © 6244-1615 Healthwise[employer]. Care instructions adapted under license by [employer] (which disclaims liability or warranty for this information). If you have questions about a medical condition or this instruction, always ask your healthcare professional. [address] any warranty or liability for your use of this information.

## 2019-08-29 NOTE — PROGRESS NOTES
Patient: Kan Russell MRN: [id number]  SSN: [id number]    YOB: 1970  Age: 52 y.o. Sex: female      Date of Service: 8/29/2019   Provider: SAHARA Jhaveri   Office Location:   [facility], [address] [place], [address]  Office Phone: [phone]  Office Fax: [phone]      REASON FOR VISIT:   Chief Complaint   Patient presents with    Hypertension        VITALS:   Visit Vitals  /75   Pulse 78   Resp 16   Ht 5' 2\" (1.575 m)   Wt 248 lb (112.5 kg)   SpO2 98%   BMI 45.36 kg/m²        MEDICATIONS:   Current Outpatient Medications on File Prior to Visit   Medication Sig Dispense Refill    hydroCHLOROthiazide (HYDRODIURIL) 25 mg tablet Take 1 Tab by mouth daily. 30 Tab 2    ibuprofen (MOTRIN) 800 mg tablet Take 1 Tab by mouth every eight (8) hours as needed for Pain. 90 Tab 0    triamcinolone acetonide (KENALOG) 0.1 % topical cream Apply  to affected area daily.  montelukast (SINGULAIR) 10 mg tablet Take 10 mg by mouth daily. No current facility-administered medications on file prior to visit. ALLERGIES:   Allergies   Allergen Reactions    Latex Hives        MEDICAL/SURGICAL HISTORY:  Past Medical History:   Diagnosis Date    Benign essential hypertension     Eczema     Environmental allergies       Past Surgical History:   Procedure Laterality Date    HX BREAST BIOPSY Left 6/16/2016    LEFT BREAST NEEDLE LOCALIZED BIOPSY performed by Jamal Grey MD at [facility]    HX TUBAL LIGATION  1994        FAMILY HISTORY:  Family History   Problem Relation Age of Onset   [facility] Cancer Mother         unsure what kind    Hypertension Father     Heart Attack Father         SOCIAL HISTORY:  Social History     Tobacco Use    Smoking status: Never Smoker    Smokeless tobacco: Never Used   Substance Use Topics    Alcohol use:  Yes     Alcohol/week: 2.0 - 3.0 standard drinks     Types: 1 Glasses of wine, 1 - 2 Standard drinks or equivalent per week     Comment: 2-3 times per week    Drug use: No         HISTORY OF PRESENT ILLNESS: Desiree Smith is a 52 y.o. female who presents to the office for a routine follow up visit. Hypertension -   Currently, the patient's condition is stable, well controlled. Reports compliance with HCTZ 25 mg daily. She was recently switched to this from amlodipine due to complaints of LE edema, which has improved. She does report some side effects of urinary urgency and is wondering if there is anything that can be done to combat that   Home BP readings: not checking   Lifestyle modifications: trying to follow low sodium diet. Patient denies headaches, visual disturbances, shortness of breath, chest pain, heart palpitations, lightheadedness, syncope     Leg Pain -   Acutely, patient complains of an intermittent \"throbbing\" pain in her left calf. States it comes and goes, and is generally worse after she has been on her feet all day. She denies redness or swelling. no recent surgery, travel, or prolonged immobility. She would like testing to r/o a blood clot      REVIEW OF SYSTEMS:  ROS as noted in HPI     PHYSICAL EXAMINATION:  Physical Exam   Constitutional: She is oriented to person, place, and time and well-developed, well-nourished, and in no distress. Cardiovascular: Normal rate, regular rhythm, normal heart sounds and intact distal pulses. Exam reveals no gallop and no friction rub. No murmur heard. no significant LE edema noted   Pulmonary/Chest: Effort normal and breath sounds normal. She has no wheezes. She has no rales. Neurological: She is alert and oriented to person, place, and time. Gait normal.   Skin: Skin is warm and dry. No rash noted.    Psychiatric: Mood, memory and affect normal.        RESULTS:  Lab Results   Component Value Date/Time    Sodium 141 05/15/2019 08:02 AM    Potassium 3.8 05/15/2019 08:02 AM    Chloride 104 05/15/2019 08:02 AM    CO2 31 05/15/2019 08:02 AM    Anion gap 6 05/15/2019 08:02 AM    Glucose 103 (H) 05/15/2019 08:02 AM    BUN 16 05/15/2019 08:02 AM    Creatinine 0.75 05/15/2019 08:02 AM    BUN/Creatinine ratio 21 (H) 05/15/2019 08:02 AM    GFR est AA >60 05/15/2019 08:02 AM    GFR est non-AA >60 05/15/2019 08:02 AM    Calcium 9.0 05/15/2019 08:02 AM    Bilirubin, total 0.2 05/15/2019 08:02 AM    AST (SGOT) 15 05/15/2019 08:02 AM    Alk. phosphatase 62 05/15/2019 08:02 AM    Protein, total 7.5 05/15/2019 08:02 AM    Albumin 3.7 05/15/2019 08:02 AM    Globulin 3.8 05/15/2019 08:02 AM    A-G Ratio 1.0 05/15/2019 08:02 AM    ALT (SGPT) 27 05/15/2019 08:02 AM      Lab Results   Component Value Date/Time    Cholesterol, total 237 (H) 05/15/2019 08:02 AM    HDL Cholesterol 62 (H) 05/15/2019 08:02 AM    LDL, calculated 153.8 (H) 05/15/2019 08:02 AM    VLDL, calculated 21.2 05/15/2019 08:02 AM    Triglyceride 106 05/15/2019 08:02 AM    CHOL/HDL Ratio 3.8 05/15/2019 08:02 AM      Lab Results   Component Value Date/Time    Hemoglobin A1c 5.7 (H) 05/15/2019 08:02 AM        ASSESSMENT/PLAN:  Diagnoses and all orders for this visit:    1. Essential hypertension  - Well controlled, tolerating HCTZ with minimal side effects. Counseled on Kegel exercises for urinary urgency  - Continue current regimen  Orders:    -     hydroCHLOROthiazide (HYDRODIURIL) 25 mg tablet; Take 1 Tab by mouth daily. 2. Lower extremity edema  - Improved with change in HTN meds  Orders:    -     hydroCHLOROthiazide (HYDRODIURIL) 25 mg tablet; Take 1 Tab by mouth daily. 3. Pain of left calf  - Relatively low clinical suspicion for DVT, but duplex study ordered to r/o definitively  Orders:    -     DUPLEX LOWER EXT VENOUS LEFT; Future    4. Impaired fasting glucose  5. Morbid obesity with BMI of 40.0-44.9, adult ([address]  - Counseled on diet & exercise   - Will re-check labs at next visit. f/u 6 months for routine care    All questions answered.  Patient expresses understanding and agrees with the plan as detailed above.     PATIENT CARE TEAM:   Patient Care Team:  SAHARA Diaz as PCP - General (Physician Assistant)  Cam Pickering MD (Dermatology)       SAHARA Irvin   August 29, 2019   4:11 PM

## 2019-08-29 NOTE — PROGRESS NOTES
1. Have you been to the ER, urgent care clinic since your last visit? Hospitalized since your last visit? No    2. Have you seen or consulted any other health care providers outside of the [address] [place] [address] since your last visit? Include any pap smears or colon screening.  No

## 2019-09-30 ENCOUNTER — HOSPITAL ENCOUNTER (OUTPATIENT)
Dept: VASCULAR SURGERY | Age: 49
Discharge: HOME | End: 2019-09-30
Attending: PHYSICIAN ASSISTANT
Payer: COMMERCIAL

## 2019-09-30 DIAGNOSIS — R60.0 LOWER EXTREMITY EDEMA: ICD-10-CM

## 2019-09-30 DIAGNOSIS — M79.662 PAIN OF LEFT CALF: ICD-10-CM

## 2019-09-30 PROCEDURE — 93971 EXTREMITY STUDY: CPT

## 2019-10-21 NOTE — PROGRESS NOTES
[phone] spoke with Donnell Mckay, all patient identifiers verified, she was advised that her ultrasound was negative for any blood clots.  Also she wanted to inform Dr. Rihc Polanco that her left leg still hurts and wanted to know his advise

## 2019-10-22 NOTE — PROGRESS NOTES
Nurse to advise to follow-up in office or she can see an orthopedist.  She should consider whichever is quicker.

## 2019-10-25 NOTE — PROGRESS NOTES
Left 2nd message for Mason Morley to call [facility] is she would like to be seen by orthopedic for leg pain.

## 2019-11-01 ENCOUNTER — OFFICE VISIT (OUTPATIENT)
Dept: FAMILY MEDICINE CLINIC | Age: 49
End: 2019-11-01

## 2019-11-01 VITALS
BODY MASS INDEX: 47.48 KG/M2 | WEIGHT: 258 LBS | HEIGHT: 62 IN | DIASTOLIC BLOOD PRESSURE: 78 MMHG | TEMPERATURE: 98.7 F | SYSTOLIC BLOOD PRESSURE: 124 MMHG | HEART RATE: 87 BPM | RESPIRATION RATE: 16 BRPM | OXYGEN SATURATION: 97 %

## 2019-11-01 DIAGNOSIS — J02.9 SORE THROAT: ICD-10-CM

## 2019-11-01 DIAGNOSIS — R09.82 POST-NASAL DRIP: ICD-10-CM

## 2019-11-01 DIAGNOSIS — R05.9 COUGH: Primary | ICD-10-CM

## 2019-11-01 LAB
S PYO AG THROAT QL: NEGATIVE
VALID INTERNAL CONTROL?: YES

## 2019-11-01 RX ORDER — CETIRIZINE HYDROCHLORIDE 5 MG/1
5 TABLET ORAL
Qty: 30 TAB | Refills: 0 | Status: SHIPPED | OUTPATIENT
Start: 2019-11-01 | End: 2020-03-14

## 2019-11-01 RX ORDER — AMOXICILLIN AND CLAVULANATE POTASSIUM 875; 125 MG/1; MG/1
1 TABLET, FILM COATED ORAL 2 TIMES DAILY
Qty: 20 TAB | Refills: 0 | Status: SHIPPED | OUTPATIENT
Start: 2019-11-01 | End: 2019-11-11

## 2019-11-01 NOTE — PROGRESS NOTES
Chief Complaint   Patient presents with    Cough     having urinary incontinence when coughing    Sinus Pain    Nasal Congestion    Sore Throat

## 2019-11-01 NOTE — PATIENT INSTRUCTIONS
Zyrtec at bedtime over the counter. Also take antibiotic with food and take probiotic over the counter. Drink lots of water. Sore Throat: Care Instructions Your Care Instructions Infection by bacteria or a virus causes most sore throats. Cigarette smoke, dry air, air pollution, allergies, and yelling can also cause a sore throat. Sore throats can be painful and annoying. Fortunately, most sore throats go away on their own. If you have a bacterial infection, your doctor may prescribe antibiotics. Follow-up care is a key part of your treatment and safety. Be sure to make and go to all appointments, and call your doctor if you are having problems. It's also a good idea to know your test results and keep a list of the medicines you take. How can you care for yourself at home? · If your doctor prescribed antibiotics, take them as directed. Do not stop taking them just because you feel better. You need to take the full course of antibiotics. · Gargle with warm salt water once an hour to help reduce swelling and relieve discomfort. Use 1 teaspoon of salt mixed in 1 cup of warm water. · Take an over-the-counter pain medicine, such as acetaminophen (Tylenol), ibuprofen (Advil, Motrin), or naproxen (Aleve). Read and follow all instructions on the label. · Be careful when taking over-the-counter cold or flu medicines and Tylenol at the same time. Many of these medicines have acetaminophen, which is Tylenol. Read the labels to make sure that you are not taking more than the recommended dose. Too much acetaminophen (Tylenol) can be harmful. · Drink plenty of fluids. Fluids may help soothe an irritated throat. Hot fluids, such as tea or soup, may help decrease throat pain. · Use over-the-counter throat lozenges to soothe pain. Regular cough drops or hard candy may also help. These should not be given to young children because of the risk of choking. · Do not smoke or allow others to smoke around you. If you need help quitting, talk to your doctor about stop-smoking programs and medicines. These can increase your chances of quitting for good. · Use a vaporizer or humidifier to add moisture to your bedroom. Follow the directions for cleaning the machine. When should you call for help? Call your doctor now or seek immediate medical care if: 
  · You have new or worse trouble swallowing.  
  · Your sore throat gets much worse on one side.  
 Watch closely for changes in your health, and be sure to contact your doctor if you do not get better as expected. Where can you learn more? Go to http://mone-marshal.info/. Enter 062 [id number] in the search box to learn more about \"Sore Throat: Care Instructions. \" Current as of: October 21, 2018 Content Version: 12.2 © 6510-4841 [employer]. Care instructions adapted under license by [employer] (which disclaims liability or warranty for this information). If you have questions about a medical condition or this instruction, always ask your healthcare professional. [address] any warranty or liability for your use of this information.

## 2019-11-01 NOTE — PROGRESS NOTES
HISTORY OF PRESENT ILLNESS  Velma Hayes is a 52 y.o. female. HPI: Andie Garcia pt. Here with c/o dry cough, chest congestion, sore throat, post nasal drip. Started 2 wks ago. Said one of the resident was sick. No fever. Denies any wheezing. No h/o asthma. Denies any headaches or dizziness, no chest pain or sob. No palpitation. No diaphoresis. No abdominal pain. With cough sometimes feels urine incontinence. Also feels nausea along with cough. Visit Vitals  /78 (BP 1 Location: Left arm, BP Patient Position: Sitting)   Pulse 87   Temp 98.7 °F (37.1 °C) (Oral)   Resp 16   Ht 5' 2\" (1.575 m)   Wt 258 lb (117 kg)   SpO2 97%   BMI 47.19 kg/m²     Sitting on exam table. Not in an acute distress. Not using any extra respiratory muscle. Able to talk in full sentence. Taken otc  Cough syrup. No help. ROS: see hPI     Physical Exam   Constitutional: She is oriented to person, place, and time. No distress. HENT:   Throat\": bilateral large tonsils. Left tonsillar exudate noted. Neck: no palpable lymph nodes    Cardiovascular: Normal heart sounds. Pulmonary/Chest: No respiratory distress. She has no wheezes. Abdominal: Soft. There is no tenderness. Musculoskeletal: She exhibits no edema. Neurological: She is oriented to person, place, and time. Psychiatric: Her behavior is normal.       ASSESSMENT and PLAN    ICD-10-CM ICD-9-CM    1. Cough: for now zyrtec at bedtime to help with post nasal drip. R05 786.2    2. Sore throat: rapid strep negative. Noted exudate over left tonsillar area. For now giving Augmentin with food. Advised to drink more fluid. Motrin or tylenol as needed. J02.9 462 AMB POC RAPID STREP A      amoxicillin-clavulanate (AUGMENTIN) 875-125 mg per tablet   3. Post-nasal drip R09.82 784.91    Pt understood and agree with the plan     Follow-up and Dispositions    · Return in about 2 weeks (around 11/15/2019).

## 2019-11-01 NOTE — LETTER
NOTIFICATION RETURN TO WORK / SCHOOL 
 
11/1/2019 4:04 PM 
 
Ms. Louisa Elena [address] [address] To Whom It May Concern: 
 
Louisa Elena is currently under the care of [facility]. Please excuse absences for 10/31/19 and 11/1/19 She will return to work/school on: 11/04/19 If there are questions or concerns please have the patient contact our office. Sincerely, Mildred Staples MD

## 2019-12-14 DIAGNOSIS — N94.6 DYSMENORRHEA: ICD-10-CM

## 2019-12-14 DIAGNOSIS — I10 ESSENTIAL HYPERTENSION: ICD-10-CM

## 2019-12-16 RX ORDER — IBUPROFEN 800 MG/1
800 TABLET ORAL
Qty: 90 TAB | Refills: 0 | Status: SHIPPED | OUTPATIENT
Start: 2019-12-16 | End: 2020-01-14

## 2020-03-14 ENCOUNTER — HOSPITAL ENCOUNTER (EMERGENCY)
Age: 50
Discharge: HOME | End: 2020-03-14
Attending: EMERGENCY MEDICINE
Payer: COMMERCIAL

## 2020-03-14 VITALS
HEART RATE: 95 BPM | OXYGEN SATURATION: 98 % | DIASTOLIC BLOOD PRESSURE: 85 MMHG | SYSTOLIC BLOOD PRESSURE: 147 MMHG | BODY MASS INDEX: 44.16 KG/M2 | HEIGHT: 62 IN | TEMPERATURE: 98.4 F | WEIGHT: 240 LBS | RESPIRATION RATE: 20 BRPM

## 2020-03-14 DIAGNOSIS — J06.9 VIRAL URI WITH COUGH: Primary | ICD-10-CM

## 2020-03-14 LAB
FLUAV AG NPH QL IA: NEGATIVE
FLUBV AG NOSE QL IA: NEGATIVE

## 2020-03-14 PROCEDURE — U0002 COVID-19 LAB TEST NON-CDC: HCPCS

## 2020-03-14 PROCEDURE — 99282 EMERGENCY DEPT VISIT SF MDM: CPT

## 2020-03-14 PROCEDURE — 87804 INFLUENZA ASSAY W/OPTIC: CPT

## 2020-03-14 NOTE — LETTER
NOTIFICATION OF RETURN TO WORK 
 
3/14/2020 8:27 PM 
 
Ms. Lacey Smith [address] [facility] [address] Rosetta Aguilar To Whom It May Concern: 
 
Lacey Smith was under the care of [id number] [facility] EMERGENCY DEPT. She will be able to return to work on Saturday, 3/21/20 if your COVID-19 test is negative. Otherwise, you may return to work on 3/28/20. If there are questions or concerns please have the patient contact our office. Sincerely, Jaylin Bailon PA-C

## 2020-03-14 NOTE — ED TRIAGE NOTES
Pt has had cold symptoms for 3 days. 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 Has been in the same building with resident who went out with sick family members who traveled to [place].  There has also been a visitor to the building who recently traveled from [facility]

## 2020-03-15 NOTE — DISCHARGE INSTRUCTIONS
10 things you can do to manage your health at home  If you have possible or confirmed COVID-19:    Stay home from work, school, and away from other public places. If you must go out, avoid using any kind of public transportation, ridesharing, or taxis. Monitor your symptoms carefully. If your symptoms get worse, call your healthcare provider immediately. Get rest and stay hydrated. If you have a medical appointment, call the healthcare provider ahead of time and tell them that you have or may have COVID-19. For medical emergencies, call 911 and notify the dispatch personnel that you have or may have COVID-19. Cover your cough and sneezes. Wash your hands often with soap and water for at least 20 seconds or clean your hands with an alcohol-based hand  that contains at least 60% alcohol. As much as possible, stay in a specific room and away from other people in your home. Also, you should use a separate bathroom, if available. If you need to be around other people in or outside of the home, wear a facemask. Avoid sharing personal items with other people in your household, like dishes, towels, and bedding  Clean all surfaces that are touched often, like counters, tabletops, and doorknobs. Use household cleaning sprays or wipes according to the label instructions. For any additional questions about your care, contact your healthcare provider or state or local health department.

## 2020-03-15 NOTE — ED NOTES
Written and verbal discharge instructions given. Patient verbalizes understanding of same. Patient denies  further questions about treatment and discharge instructions. Left ED with patent airway and steady gait. Arm band removed shredded. Swabs obtained by [facility] and sent to lab.

## 2020-03-16 ENCOUNTER — TELEPHONE (OUTPATIENT)
Dept: OTHER | Age: 50
End: 2020-03-16

## 2020-03-16 ENCOUNTER — PATIENT OUTREACH (OUTPATIENT)
Dept: OTHER | Age: 50
End: 2020-03-16

## 2020-03-16 NOTE — PROGRESS NOTES
Patient on report as eligible for Case Management. Left discreet message on voicemail with this CM contact information. Will attempt to contact again to offer [address] [place] [address] Management services. Pt was seen at [facility] ER 3/14/2020. Diagnosis: Viral URI with cough - not checked for COVID - 19, self voluntary isolation. \"history of environmental allergies, eczema, hypertension who presents to the emergency department for evaluation of cough, congestion, sneezing, and body aches for the past 3 days. 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 Patient states most individuals in the nursing home use the same elevator. This occurred within the last week or 2. Patient states the resident was not tested for COVID-19. Patient denies any known exposure to individuals who have tested positive for COVID-19. No recent fever, chills, vomiting, diarrhea, urinary symptoms, possibility of pregnancy, or any other complaints. \"

## 2020-03-16 NOTE — TELEPHONE ENCOUNTER
Patient on report as eligible for Case Management. Left discreet message on voicemail with this CM contact information. Will attempt to contact again to offer [address] [place] [address] Management services.   
  
Pt was seen at [facility] ER 3/14/2020. Diagnosis: Viral URI with cough - not checked for COVID - 19, self volentary isolation.  
  
\"history of environmental allergies, eczema, hypertension who presents to the emergency department for evaluation of cough, congestion, sneezing, and body aches for the past 3 days.  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 Vinicio Amador states most individuals in the nursing home use the same elevator.  This occurred within the last week or 2.  Patient states the resident was not tested for COVID-19.  Patient denies any known exposure to individuals who have tested positive for COVID-19.  No recent fever, chills, vomiting, diarrhea, urinary symptoms, possibility of pregnancy, or any other complaints. \" 
 
Message routed to PCP.

## 2020-03-17 ENCOUNTER — PATIENT OUTREACH (OUTPATIENT)
Dept: OTHER | Age: 50
End: 2020-03-17

## 2020-03-17 NOTE — PROGRESS NOTES
Patient identified as eligible for [address] [place] [address] services. Second telephone outreach attempted. Left discreet voicemail with this CM confidential contact information. Will send UTR letter.

## 2020-03-18 ENCOUNTER — PATIENT OUTREACH (OUTPATIENT)
Dept: OTHER | Age: 50
End: 2020-03-18

## 2020-03-18 NOTE — PROGRESS NOTES
Third attempt to reach pt post ER visit, no answer. VM left to return call. UTR letter mailed. Copy of COVID19 AVS also mailed to pt.

## 2020-03-18 NOTE — LETTER
3/18/2020 2:26 PM 
Ms. Angely Ramachandran [address] [facility] [address] Dear Ms. Jeremie Danielson, My name is Brittney, [address] for [address] [place] [address], and I have been trying to reach you. The [facility]  is a free-of-charge, confidential service provided to our associates and their family members covered by the [facility]. Part of my job is to follow up with members who have recently been in the hospital or emergency room, to help them coordinate their care and answer questions they may have about their visit. I am able to provide assistance with medication questions, scheduling needed follow-up appointments, and arranging services like home health or home medical equipment. I can also provide education regarding your hospital or ER visit as well as your medical conditions. As healthcare providers, we know that patients do better when they have close follow up with a primary care provider (PCP), especially after a hospital or emergency department visit. If you do not have a PCP, I can help you find one that is convenient to you and covered by your insurance. I can also help you understand any after visit instructions, such as what symptoms to watch out for, or any new or changed medications. Remember that you can access your After Visit Summary by logging into your [employer] account. If you do not have a [employer] account, I can help you request access. Our program is designed to provide you with the opportunity to have a [address] [place] [address] FOR CHILDREN partner with you for your healthcare needs. Please contact me at the below number if I can provide you with assistance for any of the above services. Sincerely, Jenny Holder RN, [address] [place] [address] Road [facility] AT [address], [address] [phone] Fax [email or web address]  
 
 
 
 
 
 Preventing the Spread of Coronavirus Disease 2019 in Homes and Residential Communities For the most recent information go to [employer] Laboratories[employer] Prevention steps for People with confirmed or suspected COVID-19 (including persons under investigation) who do not need to be hospitalized 
and People with confirmed COVID-19 who were hospitalized and determined to be medically stable to go home Your healthcare provider and public health staff will evaluate whether you can be cared for at home. If it is determined that you do not need to be hospitalized and can be isolated at home, you will be monitored by staff from your local or state health department. You should follow the prevention steps below until a healthcare provider or local or state health department says you can return to your normal activities. Stay home except to get medical care People who are mildly ill with COVID-19 are able to isolate at home during their illness. You should restrict activities outside your home, except for getting medical care. Do not go to work, school, or public areas. Avoid using public transportation, ride-sharing, or taxis. Separate yourself from other people and animals in your home People: As much as possible, you should stay in a specific room and away from other people in your home. Also, you should use a separate bathroom, if available. Animals: You should restrict contact with pets and other animals while you are sick with COVID-19, just like you would around other people. Although there have not been reports of pets or other animals becoming sick with COVID-19, it is still recommended that people sick with COVID-19 limit contact with animals until more information is known about the virus. When possible, have another member of your household care for your animals while you are sick.  If you are sick with COVID-19, avoid contact with your pet, including petting, snuggling, being kissed or licked, and sharing food. If you must care for your pet or be around animals while you are sick, wash your hands before and after you interact with pets and wear a facemask. Call ahead before visiting your doctor If you have a medical appointment, call the healthcare provider and tell them that you have or may have COVID-19. This will help the healthcare providers office take steps to keep other people from getting infected or exposed. Wear a facemask You should wear a facemask when you are around other people (e.g., sharing a room or vehicle) or pets and before you enter a healthcare providers office. If you are not able to wear a facemask (for example, because it causes trouble breathing), then people who live with you should not stay in the same room with you, or they should wear a facemask if they enter your room. Cover your coughs and sneezes Cover your mouth and nose with a tissue when you cough or sneeze. Throw used tissues in a lined trash can. Immediately wash your hands with soap and water for at least 20 seconds or, if soap and water are not available, clean your hands with an alcohol-based hand  that contains at least 60% alcohol. Clean your hands often Wash your hands often with soap and water for at least 20 seconds, especially after blowing your nose, coughing, or sneezing; going to the bathroom; and before eating or preparing food. If soap and water are not readily available, use an alcohol-based hand  with at least 60% alcohol, covering all surfaces of your hands and rubbing them together until they feel dry. Soap and water are the best option if hands are visibly dirty. Avoid touching your eyes, nose, and mouth with unwashed hands. Avoid sharing personal household items You should not share dishes, drinking glasses, cups, eating utensils, towels, or bedding with other people or pets in your home.  After using these items, they should be washed thoroughly with soap and water. Clean all high-touch surfaces everyday High touch surfaces include counters, tabletops, doorknobs, bathroom fixtures, toilets, phones, keyboards, tablets, and bedside tables. Also, clean any surfaces that may have blood, stool, or body fluids on them. Use a household cleaning spray or wipe, according to the label instructions. Labels contain instructions for safe and effective use of the cleaning product including precautions you should take when applying the product, such as wearing gloves and making sure you have good ventilation during use of the product. Monitor your symptoms Seek prompt medical attention if your illness is worsening (e.g., difficulty breathing). Before seeking care, call your healthcare provider and tell them that you have, or are being evaluated for, COVID-19. Put on a facemask before you enter the facility. These steps will help the healthcare providers office to keep other people in the office or waiting room from getting infected or exposed. Ask your healthcare provider to call the local or [facility] health department. Persons who are placed under active monitoring or facilitated self-monitoring should follow instructions provided by their local health department or occupational health professionals, as appropriate. When working with your local health department check their available hours. If you have a medical emergency and need to call 911, notify the dispatch personnel that you have, or are being evaluated for COVID-19. If possible, put on a facemask before emergency medical services arrive. Discontinuing home isolation Patients with confirmed COVID-19 should remain under home isolation precautions until the risk of secondary transmission to others is thought to be low.  The decision to discontinue home isolation precautions should be made on a case-by-case basis, in consultation with healthcare providers and state and local health departments.

## 2020-03-20 LAB — SARS-COV-2, COV2NT: NOT DETECTED

## 2020-03-26 ENCOUNTER — PATIENT OUTREACH (OUTPATIENT)
Dept: OTHER | Age: 50
End: 2020-03-26

## 2020-04-02 ENCOUNTER — PATIENT OUTREACH (OUTPATIENT)
Dept: OTHER | Age: 50
End: 2020-04-02

## 2020-10-01 ENCOUNTER — TELEPHONE (OUTPATIENT)
Dept: FAMILY MEDICINE CLINIC | Age: 50
End: 2020-10-01

## 2020-10-01 ENCOUNTER — OFFICE VISIT (OUTPATIENT)
Dept: FAMILY MEDICINE CLINIC | Age: 50
End: 2020-10-01
Payer: COMMERCIAL

## 2020-10-01 VITALS
DIASTOLIC BLOOD PRESSURE: 80 MMHG | BODY MASS INDEX: 47.48 KG/M2 | RESPIRATION RATE: 18 BRPM | SYSTOLIC BLOOD PRESSURE: 128 MMHG | HEIGHT: 62 IN | HEART RATE: 75 BPM | TEMPERATURE: 98.3 F | OXYGEN SATURATION: 98 % | WEIGHT: 258 LBS

## 2020-10-01 DIAGNOSIS — E66.01 MORBID OBESITY WITH BMI OF 40.0-44.9, ADULT (HCC): ICD-10-CM

## 2020-10-01 DIAGNOSIS — M79.672 LEFT FOOT PAIN: ICD-10-CM

## 2020-10-01 DIAGNOSIS — M79.605 LEFT LEG PAIN: ICD-10-CM

## 2020-10-01 DIAGNOSIS — Z12.31 BREAST CANCER SCREENING BY MAMMOGRAM: ICD-10-CM

## 2020-10-01 DIAGNOSIS — Z12.11 COLON CANCER SCREENING: ICD-10-CM

## 2020-10-01 DIAGNOSIS — Z23 ENCOUNTER FOR IMMUNIZATION: ICD-10-CM

## 2020-10-01 DIAGNOSIS — I10 ESSENTIAL HYPERTENSION: Primary | ICD-10-CM

## 2020-10-01 DIAGNOSIS — L30.9 ECZEMA, UNSPECIFIED TYPE: ICD-10-CM

## 2020-10-01 PROCEDURE — 90471 IMMUNIZATION ADMIN: CPT | Performed by: FAMILY MEDICINE

## 2020-10-01 PROCEDURE — 90686 IIV4 VACC NO PRSV 0.5 ML IM: CPT | Performed by: FAMILY MEDICINE

## 2020-10-01 PROCEDURE — 99214 OFFICE O/P EST MOD 30 MIN: CPT | Performed by: FAMILY MEDICINE

## 2020-10-01 RX ORDER — TRIAMCINOLONE ACETONIDE 1 MG/G
OINTMENT TOPICAL 2 TIMES DAILY
Qty: 2270 G | Refills: 0 | Status: SHIPPED | OUTPATIENT
Start: 2020-10-01 | End: 2021-01-21 | Stop reason: SDUPTHER

## 2020-10-01 RX ORDER — IBUPROFEN 800 MG/1
800 TABLET ORAL
Qty: 90 TAB | Refills: 0 | Status: SHIPPED | OUTPATIENT
Start: 2020-10-01 | End: 2021-01-21 | Stop reason: SDUPTHER

## 2020-10-01 RX ORDER — HYDROCHLOROTHIAZIDE 25 MG/1
25 TABLET ORAL DAILY
Qty: 90 TAB | Refills: 1 | Status: CANCELLED | OUTPATIENT
Start: 2020-10-01

## 2020-10-01 NOTE — PROGRESS NOTES
1. Have you been to the ER, urgent care clinic since your last visit? Hospitalized since your last visit? No    2. Have you seen or consulted any other health care providers outside of the [address] [place] [address] since your last visit? Include any pap smears or colon screening. No         Have you been diagnosed with, tested for, or told that you are suspected of having COVID-19 (coronovirus)? Tested 2times a week for work / neg   Have you had a fever or taken medication to treat a fever in the past 72 hours? no  Have you had a cough, SOB, or flu-like symptoms within the past 3 days?no  Have you had direct contact with someone who tested positive for COVID-19 within the past 14 days? no  Do you have a household member with flu-like symptoms, including fever, cough, or SOB? no  Do you currently have flu-like symptoms including fever, cough, or SOB?  no  Are you experiencing new loss of taste or smell? no

## 2020-10-01 NOTE — TELEPHONE ENCOUNTER
Have you been diagnosed with, tested for, or told that you are suspected of having COVID-19 (coronovirus)? Tested 2times a week for work / neg Have you had a fever or taken medication to treat a fever in the past 72 hours? no 
Have you had a cough, SOB, or flu-like symptoms within the past 3 days?no Have you had direct contact with someone who tested positive for COVID-19 within the past 14 days? no 
Do you have a household member with flu-like symptoms, including fever, cough, or SOB? no 
Do you currently have flu-like symptoms including fever, cough, or SOB?  no 
Are you experiencing new loss of taste or smell? no

## 2020-10-01 NOTE — PROGRESS NOTES
Flulaval 0.5 ml given IM in left deltoid. Lot # [id number], exp date 06/30/2021. Patient tolerated injection well. No adverse reaction noted.

## 2020-10-01 NOTE — PROGRESS NOTES
Chief Complaint   Patient presents with    Leg Pain     c/o left leg sharp pain     Other     concern about a possible pinch nerve in left shoulder     Dry Skin     SUBJECTIVE    Patient presents for leg pain however has never been seen by me and is wanted to manage several issues as she wants to establish care. She is a challenged historian. She says that she has ongoing left leg pain and foot pain. She says that the leg hurts in the lateral aspect. She has had some foot pain as well and cannot tell me where other than the foot. She cannot identify any aggravating or alleviating factors other than it hurts. Exact duration is unclear but seems to be chronic like in the order of greater than 1 year. She has mentioned this in the past and was worried about circulation. She had a ultrasound / venous duplex ordered which was normal.  She has not tried any PT.  NSAIDs work well if taken once daily. Tylenol is ineffective. Mentions that she thinks she has disc herniations as well and that her entire left side, including her arm is affected. She has seen Dr. Kulwant Shepherd in the past for her right ankle. She does not want to go to formal PT. Wants refills of her HCTZ which she takes for HTN. She has not had labs and is well overdue. Seeing derm but is overdu eso she is asking for refills of topical steroid for treatment of eczema flare-ups. She says it is located in various places intermittently. She is asking for a mammogram despite having an OB/GYN as well. She reports she is trying to find a new one. She is also asking if she can get her [employer] forms completed today but does not have them and is actually overdue on it (was due last week).       ROS:  History obtained from the patient  · General: negative for - chills, fever  · HEENT: no sore throat, nasal congestion  · Respiratory: no cough, shortness of breath, or wheezing  · Cardiovascular: no chest pain, palpitations, or dyspnea on exertion  · Gastrointestinal: no abdominal pain, N/V, change in bowel habits, or black or bloody stools. Has not had colonoscopy. · Musculoskeletal: no back pain, no neck pain  · Neurological: no numbness, tingling, headache or dizziness  · Endo:  No polyuria or polydipsia. OBJECTIVE    Blood pressure 128/80, pulse 75, temperature 98.3 °F (36.8 °C), temperature source Oral, resp. rate 18, height 5' 2\" (1.575 m), weight 258 lb (117 kg), last menstrual period 09/26/2020, SpO2 98 %. General:  Alert, cooperative, well appearing, in no apparent distress. CV:  The heart sounds are regular in rate and rhythm. There is a normal S1 and S2. There or no murmurs. Lungs: Inspiratory and expiratory efforts are full and unlabored. Lung sounds are clear and equal to auscultation throughout all lung fields without wheezing, rales, or rhonchi. Neuro:  No deficits noted. Normal muscle tone and strength. Gait is normal.  She is in a high heeled shoe walking comfortably. Left leg:  No swelling. Nontender. Skin:  No rashes, no jaundice. Psych: normal affect. Mood good. Oriented x 3. Judgement and insight intact. ASSESSMENT / PLAN    ICD-10-CM ICD-9-CM    1. Essential hypertension  I10 401.9 CBC WITH AUTOMATED DIFF      METABOLIC PANEL, COMPREHENSIVE      LIPID PANEL   2. Morbid obesity with BMI of 40.0-44.9, adult (HCC)  E66.01 278.01     Z68.41 V85.41    3. Left leg pain  M79.605 729.5 REFERRAL TO ORTHOPEDIC SURGERY   4. Left foot pain  M79.672 729.5 REFERRAL TO ORTHOPEDIC SURGERY   5. Colon cancer screening  Z12.11 V76.51 REFERRAL TO GASTROENTEROLOGY   6. Eczema, unspecified type  L30.9 692.9 triamcinolone acetonide (KENALOG) 0.1 % ointment   7. Breast cancer screening by mammogram  Z12.31 V76.12 BASSEM MAMMO BI SCREENING INCL CAD   8.  Encounter for immunization  Z23 V03.89 OR IMMUNIZ ADMIN,1 SINGLE/COMB VAC/TOXOID      INFLUENZA VIRUS VAC QUAD,SPLIT,PRESV FREE SYRINGE IM     HTN - refills of meds for now until labs done. She is overdue. Serious obesity - based on co-morbidities. Left leg pain / left foot pain - I offered her PT for starters along with imaging of the tib-fib. She did not want to do PT. The pain is challenging to determine an etiology since the exam is benign and the history is vague. She may benefit from advanced imaging or from even a work-up of her low back to look for radiculopathy being a cause. This is a work-up best left up to our orthopedic colleagues perhaps. I am not getting anywhere with suggestions of comfortable footwear or PT. Referred for colon cancer screening and for mammography. Also advised she sees OB/GYN annually. Eczema - she is overdue to see her derm but I am agreeable to refilling her topical steroid. She requested an ointment as prescribed. Flu vaccine administered. All chart history elements were reviewed by me at the time of the visit even though marked at time of note closure. Patient understands our medical plan. Patient has provided input and agrees with goals. Alternatives have been explained and offered. All questions answered. The patient is to call if condition worsens or fails to improve. Follow-up and Dispositions    · Return in about 6 months (around 4/1/2021) for routine care.

## 2020-10-01 NOTE — PATIENT INSTRUCTIONS
Influenza (Flu) Vaccine (Inactivated or Recombinant): What You Need to Know Why get vaccinated? Influenza vaccine can prevent influenza (flu). Flu is a contagious disease that spreads around the [place] every year, usually between October and May. Anyone can get the flu, but it is more dangerous for some people. Infants and young children, people 72years of age and older, pregnant women, and people with certain health conditions or a weakened immune system are at greatest risk of flu complications. Pneumonia, bronchitis, sinus infections and ear infections are examples of flu-related complications. If you have a medical condition, such as heart disease, cancer or diabetes, flu can make it worse. Flu can cause fever and chills, sore throat, muscle aches, fatigue, cough, headache, and runny or stuffy nose. Some people may have vomiting and diarrhea, though this is more common in children than adults. Each year, thousands of people in the [facility] die from flu, and many more are hospitalized. Flu vaccine prevents millions of illnesses and flu-related visits to the doctor each year. Influenza vaccine CDC recommends everyone 10months of age and older get vaccinated every flu season. Children 6 months through 6years of age may need 2 doses during a single flu season. Everyone else needs only 1 dose each flu season. It takes about 2 weeks for protection to develop after vaccination. There are many flu viruses, and they are always changing. Each year a new flu vaccine is made to protect against three or four viruses that are likely to cause disease in the upcoming flu season. Even when the vaccine doesn't exactly match these viruses, it may still provide some protection. Influenza vaccine does not cause flu. Influenza vaccine may be given at the same time as other vaccines. Talk with your health care provider Tell your vaccine provider if the person getting the vaccine: · Has had an allergic reaction after a previous dose of influenza vaccine, or has any severe, life-threatening allergies. · Has ever had Guillain-Barré Syndrome (also called GBS). In some cases, your health care provider may decide to postpone influenza vaccination to a future visit. People with minor illnesses, such as a cold, may be vaccinated. People who are moderately or severely ill should usually wait until they recover before getting influenza vaccine. Your health care provider can give you more information. Risks of a vaccine reaction · Soreness, redness, and swelling where shot is given, fever, muscle aches, and headache can happen after influenza vaccine. · There may be a very small increased risk of Guillain-Barré Syndrome (GBS) after inactivated influenza vaccine (the flu shot). [facility] children who get the flu shot along with pneumococcal vaccine (PCV13), and/or DTaP vaccine at the same time might be slightly more likely to have a seizure caused by fever. Tell your health care provider if a child who is getting flu vaccine has ever had a seizure. People sometimes faint after medical procedures, including vaccination. Tell your provider if you feel dizzy or have vision changes or ringing in the ears. As with any medicine, there is a very remote chance of a vaccine causing a severe allergic reaction, other serious injury, or death. What if there is a serious problem? An allergic reaction could occur after the vaccinated person leaves the clinic. If you see signs of a severe allergic reaction (hives, swelling of the face and throat, difficulty breathing, a fast heartbeat, dizziness, or weakness), call 9-1-1 and get the person to the nearest hospital. 
For other signs that concern you, call your health care provider. Adverse reactions should be reported to the Vaccine Adverse Event Reporting System (VAERS).  Your health care provider will usually file this report, or you can do it yourself. Visit the VAERS website at www.vaers. [facility].gov or call 1-594.766.3202. VAERS is only for reporting reactions, and VAERS staff do not give medical advice. The National Vaccine Injury Compensation Program 
The National Vaccine Injury Compensation Program (VICP) is a federal program that was created to compensate people who may have been injured by certain vaccines. Visit the VICP website at www.[facility].gov/vaccinecompensation or call 7-307.414.4286 to learn about the program and about filing a claim. There is a time limit to file a claim for compensation. How can I learn more? · Ask your healthcare provider. · Call your local or state health department. · Contact the Centers for Disease Control and Prevention (CDC): 
? Call 5-685.848.7819 (1-800-CDC-INFO) or 
? Visit CDC's website at www.cdc.gov/flu Vaccine Information Statement (Interim) Inactivated Influenza Vaccine 8/15/2019 
[address] Felix Fany Tom 133OC-12 [facility] and [employer] Centers for Disease Control and Prevention Many Vaccine Information Statements are available in [place] and other languages. See www.immunize.org/vis. Muchas hojas de información sobre vacunas están disponibles en español y en otros idiomas. Visite www.immunize.org/vis. Care instructions adapted under license by [employer] (which disclaims liability or warranty for this information). If you have questions about a medical condition or this instruction, always ask your healthcare professional. [place] [address] any warranty or liability for your use of this information.

## 2020-10-03 ENCOUNTER — HOSPITAL ENCOUNTER (OUTPATIENT)
Dept: MAMMOGRAPHY | Age: 50
Discharge: HOME | End: 2020-10-03
Attending: FAMILY MEDICINE
Payer: COMMERCIAL

## 2020-10-03 DIAGNOSIS — Z12.31 BREAST CANCER SCREENING BY MAMMOGRAM: ICD-10-CM

## 2020-10-03 PROCEDURE — 77063 BREAST TOMOSYNTHESIS BI: CPT

## 2020-10-05 DIAGNOSIS — R92.8 ABNORMAL MAMMOGRAM OF RIGHT BREAST: ICD-10-CM

## 2020-10-05 DIAGNOSIS — R92.8 ABNORMAL MAMMOGRAM OF RIGHT BREAST: Primary | ICD-10-CM

## 2020-10-08 NOTE — PROGRESS NOTES
Contacted patient and verified identity using name and date of birth (2- identifiers)  Spoke with patient and she verbalized understanding of need for additional images of right breast. Scheduled for 10/22/20

## 2020-10-12 ENCOUNTER — HOSPITAL ENCOUNTER (OUTPATIENT)
Dept: LAB | Age: 50
Discharge: HOME | End: 2020-10-12
Payer: COMMERCIAL

## 2020-10-12 DIAGNOSIS — I10 ESSENTIAL HYPERTENSION: ICD-10-CM

## 2020-10-12 LAB
ALBUMIN SERPL-MCNC: 3.4 G/DL (ref 3.4–5)
ALBUMIN/GLOB SERPL: 0.9 {RATIO} (ref 0.8–1.7)
ALP SERPL-CCNC: 57 U/L (ref 45–117)
ALT SERPL-CCNC: 25 U/L (ref 13–56)
ANION GAP SERPL CALC-SCNC: 5 MMOL/L (ref 3–18)
AST SERPL-CCNC: 19 U/L (ref 10–38)
BASOPHILS # BLD: 0 K/UL (ref 0–0.1)
BASOPHILS NFR BLD: 0 % (ref 0–2)
BILIRUB SERPL-MCNC: 0.3 MG/DL (ref 0.2–1)
BUN SERPL-MCNC: 12 MG/DL (ref 7–18)
BUN/CREAT SERPL: 16 (ref 12–20)
CALCIUM SERPL-MCNC: 8.7 MG/DL (ref 8.5–10.1)
CHLORIDE SERPL-SCNC: 107 MMOL/L (ref 100–111)
CHOLEST SERPL-MCNC: 241 MG/DL
CO2 SERPL-SCNC: 31 MMOL/L (ref 21–32)
CREAT SERPL-MCNC: 0.77 MG/DL (ref 0.6–1.3)
DIFFERENTIAL METHOD BLD: ABNORMAL
EOSINOPHIL # BLD: 0.2 K/UL (ref 0–0.4)
EOSINOPHIL NFR BLD: 4 % (ref 0–5)
ERYTHROCYTE [DISTWIDTH] IN BLOOD BY AUTOMATED COUNT: 14.4 % (ref 11.6–14.5)
GLOBULIN SER CALC-MCNC: 3.7 G/DL (ref 2–4)
GLUCOSE SERPL-MCNC: 103 MG/DL (ref 74–99)
HCT VFR BLD AUTO: 36.9 % (ref 35–45)
HDLC SERPL-MCNC: 59 MG/DL (ref 40–60)
HDLC SERPL: 4.1 {RATIO} (ref 0–5)
HGB BLD-MCNC: 11.7 G/DL (ref 12–16)
LDLC SERPL CALC-MCNC: 160.4 MG/DL (ref 0–100)
LIPID PROFILE,FLP: ABNORMAL
LYMPHOCYTES # BLD: 1.8 K/UL (ref 0.9–3.6)
LYMPHOCYTES NFR BLD: 40 % (ref 21–52)
MCH RBC QN AUTO: 27.3 PG (ref 24–34)
MCHC RBC AUTO-ENTMCNC: 31.7 G/DL (ref 31–37)
MCV RBC AUTO: 86 FL (ref 74–97)
MONOCYTES # BLD: 0.6 K/UL (ref 0.05–1.2)
MONOCYTES NFR BLD: 12 % (ref 3–10)
NEUTS SEG # BLD: 2 K/UL (ref 1.8–8)
NEUTS SEG NFR BLD: 44 % (ref 40–73)
PLATELET # BLD AUTO: 241 K/UL (ref 135–420)
PMV BLD AUTO: 12 FL (ref 9.2–11.8)
POTASSIUM SERPL-SCNC: 3.9 MMOL/L (ref 3.5–5.5)
PROT SERPL-MCNC: 7.1 G/DL (ref 6.4–8.2)
RBC # BLD AUTO: 4.29 M/UL (ref 4.2–5.3)
SODIUM SERPL-SCNC: 143 MMOL/L (ref 136–145)
TRIGL SERPL-MCNC: 108 MG/DL (ref ?–150)
VLDLC SERPL CALC-MCNC: 21.6 MG/DL
WBC # BLD AUTO: 4.6 K/UL (ref 4.6–13.2)

## 2020-10-12 PROCEDURE — 80053 COMPREHEN METABOLIC PANEL: CPT

## 2020-10-12 PROCEDURE — 36415 COLL VENOUS BLD VENIPUNCTURE: CPT

## 2020-10-12 PROCEDURE — 80061 LIPID PANEL: CPT

## 2020-10-12 PROCEDURE — 85025 COMPLETE CBC W/AUTO DIFF WBC: CPT

## 2020-10-12 NOTE — PROGRESS NOTES
Nurse to advise that her cholesterol is high, sugar is a bit high, and she is a bit anemic. These are all minor and not urgent. She can either follow-up in April when she is due for her HTN or she can see me sooner to discuss in more depth.

## 2020-10-12 NOTE — PROGRESS NOTES
[phone] (home) 2 patient identifiers verified (name / ) with Rainey Frankel. Rainey Frankel was advised that her labs show that her cholesterol is high(241), sugar is a bit high (103) and she is a bit anemic. These are all minor and not urgent per Dr. Bety Lovett and he states that she can either follow-up in April when she is due for her HTN or she can see him sooner to discuss in more depth. Rainey Frankel did state during our conversation that she does fill a little sluggish, but has declined at this time to make follow. She states she will call back.

## 2020-10-20 ENCOUNTER — OFFICE VISIT (OUTPATIENT)
Dept: ORTHOPEDIC SURGERY | Age: 50
End: 2020-10-20
Payer: COMMERCIAL

## 2020-10-20 VITALS
DIASTOLIC BLOOD PRESSURE: 97 MMHG | SYSTOLIC BLOOD PRESSURE: 150 MMHG | HEIGHT: 62 IN | OXYGEN SATURATION: 97 % | WEIGHT: 265 LBS | HEART RATE: 70 BPM | TEMPERATURE: 96.8 F | BODY MASS INDEX: 48.76 KG/M2

## 2020-10-20 DIAGNOSIS — R22.42 KNEE MASS, LEFT: Primary | ICD-10-CM

## 2020-10-20 DIAGNOSIS — M72.2 PLANTAR FASCIITIS, LEFT: ICD-10-CM

## 2020-10-20 DIAGNOSIS — M92.512: ICD-10-CM

## 2020-10-20 DIAGNOSIS — M79.672 LEFT FOOT PAIN: ICD-10-CM

## 2020-10-20 DIAGNOSIS — M89.8X6 PAIN OF LEFT TIBIA: ICD-10-CM

## 2020-10-20 PROCEDURE — 73590 X-RAY EXAM OF LOWER LEG: CPT | Performed by: ORTHOPAEDIC SURGERY

## 2020-10-20 PROCEDURE — 73630 X-RAY EXAM OF FOOT: CPT | Performed by: ORTHOPAEDIC SURGERY

## 2020-10-20 PROCEDURE — 99214 OFFICE O/P EST MOD 30 MIN: CPT | Performed by: ORTHOPAEDIC SURGERY

## 2020-10-20 RX ORDER — FAMOTIDINE 40 MG/1
40 TABLET, FILM COATED ORAL DAILY
Qty: 30 TAB | Refills: 0 | Status: SHIPPED | OUTPATIENT
Start: 2020-10-20 | End: 2021-10-22

## 2020-10-20 RX ORDER — MELOXICAM 15 MG/1
15 TABLET ORAL
Qty: 30 TAB | Refills: 0 | Status: SHIPPED | OUTPATIENT
Start: 2020-10-20 | End: 2020-12-01 | Stop reason: SDUPTHER

## 2020-10-20 NOTE — PROGRESS NOTES
AMBULATORY PROGRESS NOTE      Patient: Kim Edmonds             MRN: [id number]     SSN: [id number] Body mass index is 48.47 kg/m². YOB: 1970     AGE: 48 y.o. EX: female    PCP: Bridget Tejeda MD       IMPRESSION //  DIAGNOSIS AND TREATMENT PLAN      DIAGNOSES  1. Knee mass, left    2. Plantar fasciitis, left    3. Left tibia vara    4. Pain of left tibia    5. Left foot pain        Orders Placed This Encounter    Generic Supply Order     dorsal night splint    AMB POC XRAY; TIBIA & FIBULA, TWO VIE     Order Specific Question:   Reason for Exam     Answer:   pain     Order Specific Question:   Is Patient Pregnant? Answer:   Unknown    AMB POC XRAY, FOOT; COMPLETE, 3+ VIEW     Order Specific Question:   Reason for Exam     Answer:   pain     Order Specific Question:   Is Patient Pregnant? Answer:   Unknown    MRI KNEE LT WO CONT     Standing Status:   Future     Standing Expiration Date:   4/20/2021     Order Specific Question:   Is Patient Pregnant? Answer:   Unknown     Order Specific Question:   Arthrogram study     Answer:   No    MRI TIB/FIB LT WO CONT     Standing Status:   Future     Standing Expiration Date:   4/20/2021     Order Specific Question:   Is Patient Pregnant? Answer:   Unknown    meloxicam (Mobic) 15 mg tablet     Sig: Take 1 Tab by mouth daily (with breakfast). Dispense:  30 Tab     Refill:  0    famotidine (PEPCID) 40 mg tablet     Sig: Take 1 Tab by mouth daily. Dispense:  30 Tab     Refill:  0      She has a fullness, to the anteromedial portion of her left leg, this fullness, there appears to be a subcutaneous mass, but certainly this left anteromedial leg is larger in circumference compared to that on the right side because it is fullness, or subcutaneous fullness that looks like a mass, recommendation, MRI of her left tib-fib in order to assess this region.      Clinically, she has some mild tenderness to the anterior tibial proximal one third tibia, on the anteromedial border of her tibia. As relates to her foot, she has continued plantar fasciitis, of this left foot. X-rays of her left tib-fib, 2 views, shows swelling in the soft tissues along the anteromedial portion of the proximal one third tibia, see no osteolytic or osteoblastic lesions. X-rays left ankle, 3 views, left ankle, shows normal alignment left ankle no fracture no subluxation of dislocation. There is a small incidental bone spur seen the inferior surface of the of this left calcaneus and lateral graphic x-ray. PLAN:    1. DME order: dorsal night splint  2. HEP: plantar  Fasciitis stretching exercises  3. Mobic 15 m PO every day; 30 tablets, 0 refills. 4. Pepcid 40 m PO every day; 30 tablets, 0 refills. 5. MRI of the left tib/fib and left knee    RTO-after MRI      HPI //  OBJECTIVE EXAMINATION        Kim Edmonds IS A 48 y.o. female who presents to my outpatient office for evaluation of: left foot/leg pain. She describes having throbbing pain along front side of her leg, along tibia, down to her foot. Occasionally, she reports throbbing pain along her tibia at night but nothing that keeps her from sleeping. In the morning, she has repetitive start up pain in the bottom of her foot, but no pain along the front of her leg. The pain along the bottom of her foot eases once she starts walking. The throbbing pain she feels in her leg is worsened when she laying down or with prolonged walking. She also mentions swelling in her left leg. She works as a nurse, working 8 hours a day. The patient denies being on any blood thinners.     Visit Vitals  BP (!) 150/97 (BP 1 Location: Right arm, BP Patient Position: Sitting)   Pulse 70   Temp 96.8 °F (36 °C) (Temporal)   Ht 5' 2\" (1.575 m)   Wt 265 lb (120.2 kg)   SpO2 97%   BMI 48.47 kg/m²       ANKLE/FOOT left    Psychiatry: Alert, oriented x 3 (name,place,time of day); speech normal in context and clarity, memory intact grossly, no involuntary movements - tremors, no dementia  Gait: normal  Tenderness: mild along front portion of leg. Cutaneous: WNL  Joint Motion: WNL  Joint / Tendon Stability: No Ankle or Subtalar instability or joint laxity. No peroneal sublux ability or dislocation  Alignment: neutral Hindfoot, none Metatarsus Adductus Metatarsus. Neuro Motor/Sensory: NL/NL,  Vascular: NL foot/ankle pulses,   Lymphatics: No extremity lymphedema, No calf swelling, no tenderness to calf muscles. CHART REVIEW     Patient Active Problem List   Diagnosis Code    Morbid obesity with BMI of 40.0-44.9, adult ([facility] [address] E66.01, Z68.41    Eczema L30.9    Environmental allergies Z91.09    Impaired fasting glucose R73.01    Essential hypertension I10    Lower extremity edema R60.0    Abnormal mammogram of right breast R92.8        Sophia Beaulieu has been experiencing pain and discomfort confirmed as outlined in the pain assessment outlined below. Pain Assessment  10/20/2020   Location of Pain Foot;Leg   Location Modifiers Left   Severity of Pain 7   Quality of Pain Aching   Duration of Pain Persistent   Frequency of Pain Intermittent   Date Pain First Started (No Data)   Aggravating Factors (No Data)   Aggravating Factors Comment laying down   Limiting Behavior Some   Relieving Factors NSAID   Result of Injury No        Sophia Beaulieu  has a past medical history of Benign essential hypertension, Eczema, and Environmental allergies.      Patients is employed at:         Past Medical History:   Diagnosis Date    Benign essential hypertension     Eczema     Environmental allergies      Past Surgical History:   Procedure Laterality Date    HX BREAST BIOPSY Left 6/16/2016    LEFT BREAST NEEDLE LOCALIZED BIOPSY performed by Sabina Prieto MD at [place] [address]     Current Outpatient Medications   Medication Sig    meloxicam (Mobic) 15 mg tablet Take 1 Tab by mouth daily (with breakfast).  famotidine (PEPCID) 40 mg tablet Take 1 Tab by mouth daily.  ibuprofen (MOTRIN) 800 mg tablet Take 1 Tab by mouth every eight (8) hours as needed for Pain.  triamcinolone acetonide (KENALOG) 0.1 % ointment Apply  to affected area two (2) times a day. use thin layer    hydroCHLOROthiazide (HYDRODIURIL) 25 mg tablet Take 1 Tab by mouth daily. No current facility-administered medications for this visit. Allergies   Allergen Reactions    Latex Hives     Social History     Occupational History    Not on file   Tobacco Use    Smoking status: Never Smoker    Smokeless tobacco: Never Used   Substance and Sexual Activity    Alcohol use: Yes     Alcohol/week: 2.0 - 3.0 standard drinks     Types: 1 Glasses of wine, 1 - 2 Standard drinks or equivalent per week     Comment: 2-3 times per week    Drug use: No    Sexual activity: Yes     Partners: Male     Family History   Problem Relation Age of Onset    Cancer Mother         unsure what kind    Hypertension Father     Heart Attack Father        THE  FOR Pan Sierra MD 10/20/2020 . DIAGNOSTIC IMAGING  LAB DATA      Lab Results   Component Value Date/Time    Hemoglobin A1c 5.7 (H) 05/15/2019 08:02 AM    Hemoglobin A1c 5.8 (H) 07/31/2018 10:11 AM    //   Lab Results   Component Value Date/Time    Glucose 103 (H) 10/12/2020 09:30 AM        No results found for: RLO4IFMF, [id number]      No results found for: VITD3, XQVID2, XQVID3, XQVID, VD3RIA, [id number]      REVIEW OF SYSTEMS : 10/20/2020  ALL BELOW ARE Negative except : SEE HPI     CONSTITUTIONAL: No weight loss  PSYCHOLOGICAL : No Feelings of anxiety, depression, agitation  EYES: No blurred vision and no eye discharge. NO eye pain, double vision  ENT: No nasal discharge. No ear pain. CARDIOVASCULAR: No chest pain and no diaphoresis. RESPIRATORY: No cough, no hemoptysis.    GI: No vomiting, no diarrhea   : No urinary frequency and no dysuria. MUSCULOSKELETAL: see HPI  SKIN: No rashes. NEURO:  No dizziness,weakness, headaches// No visual changes or confusion, or seizures,   ENDOCRINE: No polyphagia and no polydipsia. HEMATOLOGY: No bleeding tendencies. DIAGNOSTIC IMAGING        X RAYS/IMAGES DONE AT [address] [place] [address] 10/20/2020      X-rays of her left tib-fib, 2 views, shows swelling in the soft tissues along the anteromedial portion of the proximal one third tibia, see no osteolytic or osteoblastic lesions. X-rays left ankle, 3 views, left ankle, shows normal alignment left ankle no fracture no subluxation of dislocation. There is a small incidental bone spur seen the inferior surface of the of this left calcaneus and lateral graphic x-ray. I have personally reviewed the results of the above study. The interpretation of this study is my professional opinion.       Marie Gresham MD  10/20/2020  2:56 PM

## 2020-10-20 NOTE — PATIENT INSTRUCTIONS
Plantar Fasciitis: Exercises Introduction Here are some examples of exercises for you to try. The exercises may be suggested for a condition or for rehabilitation. Start each exercise slowly. Ease off the exercises if you start to have pain. You will be told when to start these exercises and which ones will work best for you. How to do the exercises Towel stretch 1. Sit with your legs extended and knees straight. 2. Place a towel around your foot just under the toes. 3. Hold each end of the towel in each hand, with your hands above your knees. 4. Pull back with the towel so that your foot stretches toward you. 5. Hold the position for at least 15 to 30 seconds. 6. Repeat 2 to 4 times a session, up to 5 sessions a day. Calf stretch This exercise stretches the muscles at the back of the lower leg (the calf) and the Achilles tendon. Do this exercise 3 or 4 times a day, 5 days a week. 1. Stand facing a wall with your hands on the wall at about eye level. Put the leg you want to stretch about a step behind your other leg. 2. Keeping your back heel on the floor, bend your front knee until you feel a stretch in the back leg. 3. Hold the stretch for 15 to 30 seconds. Repeat 2 to 4 times. Plantar fascia and calf stretch Stretching the plantar fascia and calf muscles can increase flexibility and decrease heel pain. You can do this exercise several times each day and before and after activity. 1. Stand on a step as shown above. Be sure to hold on to the banister. 2. Slowly let your heels down over the edge of the step as you relax your calf muscles. You should feel a gentle stretch across the bottom of your foot and up the back of your leg to your knee. 3. Hold the stretch about 15 to 30 seconds, and then tighten your calf muscle a little to bring your heel back up to the level of the step. Repeat 2 to 4 times. Towel curls Make this exercise more challenging by placing a weighted object, such as a soup can, on the other end of the towel. 1. While sitting, place your foot on a towel on the floor and scrunch the towel toward you with your toes. 2. Then, also using your toes, push the towel away from you. [place] pickups 1. Put marbles on the floor next to a cup. 
2. Using your toes, try to lift the marbles up from the floor and put them in the cup. Follow-up care is a key part of your treatment and safety. Be sure to make and go to all appointments, and call your doctor if you are having problems. It's also a good idea to know your test results and keep a list of the medicines you take. Where can you learn more? Go to http://www.[employer] [place] in the search box to learn more about \"Plantar Fasciitis: Exercises. \" Current as of: March 2, 2020               Content Version: 12.6 © 2006-2020 [employer]. Care instructions adapted under license by [employer] (which disclaims liability or warranty for this information). If you have questions about a medical condition or this instruction, always ask your healthcare professional. [address] any warranty or liability for your use of this information.

## 2020-10-21 DIAGNOSIS — R60.0 LOWER EXTREMITY EDEMA: ICD-10-CM

## 2020-10-21 DIAGNOSIS — M89.8X6 PAIN OF LEFT TIBIA: ICD-10-CM

## 2020-10-21 DIAGNOSIS — M92.512: ICD-10-CM

## 2020-10-21 DIAGNOSIS — I10 ESSENTIAL HYPERTENSION: ICD-10-CM

## 2020-10-21 NOTE — TELEPHONE ENCOUNTER
This pharmacy faxed over request for the following prescriptions to be filled: 
 
Medication requested :  
Requested Prescriptions Pending Prescriptions Disp Refills  hydroCHLOROthiazide (HYDRODIURIL) 25 mg tablet 90 Tab 1 Sig: Take 1 Tab by mouth daily. PCP: [facility] or Print: [facility] 
Mail order or Local pharmacy [address] Scheduled appointment if not seen by current providers in office: LOV 10/1/2020 No f/u up Scheduled at this time. Due 4/1/2021

## 2020-10-22 ENCOUNTER — HOSPITAL ENCOUNTER (OUTPATIENT)
Dept: MAMMOGRAPHY | Age: 50
Discharge: HOME | End: 2020-10-22
Attending: FAMILY MEDICINE
Payer: COMMERCIAL

## 2020-10-22 ENCOUNTER — HOSPITAL ENCOUNTER (OUTPATIENT)
Dept: ULTRASOUND IMAGING | Age: 50
Discharge: HOME | End: 2020-10-22
Attending: FAMILY MEDICINE
Payer: COMMERCIAL

## 2020-10-22 DIAGNOSIS — R92.8 ABNORMAL MAMMOGRAM OF RIGHT BREAST: Primary | ICD-10-CM

## 2020-10-22 DIAGNOSIS — R92.8 ABNORMAL MAMMOGRAM OF RIGHT BREAST: ICD-10-CM

## 2020-10-22 PROCEDURE — 77061 BREAST TOMOSYNTHESIS UNI: CPT

## 2020-10-22 PROCEDURE — 76642 ULTRASOUND BREAST LIMITED: CPT

## 2020-10-22 NOTE — PROGRESS NOTES
Benign changes. Recommended 6 months follow up with diagnostic mammogram and ultrasound.  Orders in the chart

## 2020-10-23 DIAGNOSIS — R92.8 ABNORMAL MAMMOGRAM: Primary | ICD-10-CM

## 2020-10-23 RX ORDER — HYDROCHLOROTHIAZIDE 25 MG/1
25 TABLET ORAL DAILY
Qty: 90 TAB | Refills: 1 | Status: SHIPPED | OUTPATIENT
Start: 2020-10-23 | End: 2021-01-21 | Stop reason: SDUPTHER

## 2020-10-23 NOTE — PROGRESS NOTES
Please advise patient to see our breast specialist for reassurance and next steps. I am not sure if she will advise further imaging now or in 6 months. Referral placed.

## 2020-10-30 NOTE — PROGRESS NOTES
[facility]  [phone] (home) for patient to call [facility].

## 2020-11-02 NOTE — PROGRESS NOTES
Patient aware of need to see breast surgeon. She has an appt scheduled for 11/11/2020 with Dr. Kavon Chi.

## 2020-11-10 NOTE — PROGRESS NOTES
Breast Consult      Ms. Leanne Edouard is a 48year old woman who was referred for abnormal imaging. The area of concern was identified on routine screening exam. She denies breast pain, mass or nipple discharge. Additional work up including diagnostic mammogram and ultrasound were performed and read as BIRADS 3. Her most recent previous mammogram was 5/31/19. There is family history of breast cancer. Her mother had breast cancer at 72. She had a benign breast biopsy in 2016      Breast/GYN history:    Past Medical History:   Diagnosis Date    Benign essential hypertension     Eczema     Environmental allergies        Past Surgical History:   Procedure Laterality Date    HX BREAST BIOPSY Left 6/16/2016    LEFT BREAST NEEDLE LOCALIZED BIOPSY performed by Alis Rivers MD at [place] [address]       Current Outpatient Medications on File Prior to Visit   Medication Sig Dispense Refill    hydroCHLOROthiazide (HYDRODIURIL) 25 mg tablet Take 1 Tab by mouth daily. 90 Tab 1    famotidine (PEPCID) 40 mg tablet Take 1 Tab by mouth daily. 30 Tab 0    ibuprofen (MOTRIN) 800 mg tablet Take 1 Tab by mouth every eight (8) hours as needed for Pain. 90 Tab 0    triamcinolone acetonide (KENALOG) 0.1 % ointment Apply  to affected area two (2) times a day. use thin layer 2270 g 0    meloxicam (Mobic) 15 mg tablet Take 1 Tab by mouth daily (with breakfast). 30 Tab 0     No current facility-administered medications on file prior to visit. Allergies   Allergen Reactions    Latex Hives       Social History     Tobacco Use    Smoking status: Never Smoker    Smokeless tobacco: Never Used   Substance Use Topics    Alcohol use:  Yes     Alcohol/week: 2.0 - 3.0 standard drinks     Types: 1 Glasses of wine, 1 - 2 Standard drinks or equivalent per week     Frequency: 2-3 times a week     Comment: 2-3 times per week    Drug use: No       Family History   Problem Relation Age of Onset    Cancer Mother         unsure what kind    Hypertension Father     Heart Attack Father        OB History        3    Para   3    Term   3            AB        Living   3       SAB        TAB        Ectopic        Molar        Multiple        Live Births   3          Obstetric Comments     Patient's last menstrual period was 2018.   Periods regular, last 3-7 days, flow medium, moderate dysmenorrhea  History of sexually transmitted infections gonorrhea  Last pap 10/2018 neg, HR HPV neg, endometrial cells present                 ROS:   Positives are bolded  General: fevers, chills, night sweats, fatigue, weight loss, weight gain  GI: nausea, vomiting, abdominal pain, change in bowel habits, hematochezia, melena, GERD  Integ: dermatitis, abnormal moles  HEENT: visual changes, vertigo, epistaxis, dysphagia, hoarseness  Cardiac: chest pain, palpitations, HTN, edema, varicosities  Resp: cough, shortness of breath, wheezing, hemoptysis, snoring, reactive airway disease  : hematuria, dysuria, frequency, urgency, nocturia, stress urinary incontinence   MSK: weakness, joint pain, arthritis  Endocrine: diabetes, thyroid disease, polyuria, polydipsia, polyphagia, poor wound healing, heat intolerance, cold intolerance  Lymph/Heme: anemia, bruising, history of blood transfusions  Neuro: dizziness, headache, fainting, seizures, stroke  Psych: anxiety, depression    Physical Exam:  Visit Vitals  Temp 97.5 °F (36.4 °C)   Resp 20   Ht 5' 2\" (1.575 m)   Wt 119.3 kg (263 lb)   SpO2 98%   BMI 48.10 kg/m²       Gen: Well developed, well nourished woman in no acute distress  Head: normocephalic, atraumatic  Mouth: Clear, no overt lesions, oral mucosa pink and moist  Neck: supple, no masses, no adenopathy, trachea midline  Resp: clear bilaterally  Cardio: Regular rate and rhythm  Abdomen: soft, nontender, nondistended  Extremeties: warm, well-perfused  Neuro: sensation and strength grossly intact and symmetrical  Psych: alert and oriented to person, place and time  Breasts:   Right: Examined in both the supine and upright positions. There was no supraclavicular, infraclavicular, or axillary lympadenopathy. There were no dominant masses, no skin changes, no asymmetry identified Left[de-identified] Examined in both the supine and upright positions. There was no supraclavicular, infraclavicular, or axillary lympadenopathy. There were no dominant masses, no skin changes, no asymmetry identified medial circumareolar healed scar    Imaging:  10/22/20 right mammo/ultrasound ([facility])  Probably benign asymmetry right breast. As a precaution recommend 6 month  follow-up diagnostic mammogram and ultrasound if needed to document stability.     Thank you for this referral.    10/3/20 bilateral mammo ([facility])  1. Right breast asymmetry.     Recommendations: Diagnostic mammogram of the right breast with 2-D/3-D spot  compression views and possible ultrasound. These results are being provided to  the patient by letter.      BI-RADS category 0 - Needs additional imaging.     The lack of mammographic evidence of malignancy should not deter further work-up  of a clinically significant palpable finding. Radiodense breast tissue may  obscure an early malignancy or a palpable finding. 10-15% of breast cancers are  not detected by mammography.     Pathology:  6/16/16     LEFT BREAST MASS, NEEDLE LOCALIZED EXCISION    BIOPSY SITE CHANGES WITH ASSOCIATED STROMAL FIBROSIS AND ADENOSIS. NO IN SITU OR INVASIVE CARCINOMA IDENTIFIED. SURGICAL MARGINS UNREMARKABLE. Impression:  Patient Active Problem List   Diagnosis Code    Morbid obesity with BMI of 40.0-44.9, adult (HCC) E66.01, Z68.41    Eczema L30.9    Environmental allergies Z91.09    Impaired fasting glucose R73.01    Essential hypertension I10    Lower extremity edema R60.0    Abnormal mammogram R92.8          48year old woman with abnormal imaging. The imaging was reviewed in detail.   I have explained what BIRADS 3 imaging means and the imaging results. Six month follow up imaging recommended. She was asked to call sooner with questions or concerns.

## 2020-11-11 ENCOUNTER — OFFICE VISIT (OUTPATIENT)
Dept: SURGERY | Age: 50
End: 2020-11-11
Payer: COMMERCIAL

## 2020-11-11 VITALS
RESPIRATION RATE: 20 BRPM | TEMPERATURE: 97.5 F | WEIGHT: 263 LBS | BODY MASS INDEX: 48.4 KG/M2 | HEIGHT: 62 IN | OXYGEN SATURATION: 98 %

## 2020-11-11 DIAGNOSIS — R92.8 ABNORMAL MAMMOGRAM: Primary | ICD-10-CM

## 2020-11-11 PROCEDURE — 99203 OFFICE O/P NEW LOW 30 MIN: CPT | Performed by: SURGERY

## 2020-11-11 NOTE — LETTER
11/11/2020 2:29 PM 
 
Patient:  Pratima Jolly YOB: 1970 Date of Visit: 11/11/2020 Yue Clayton MD 
[address] [place], [place] [address] [id number] [place] [address] VIA In Basket Dear Yue Clayton MD, 
 
 
I had the pleasure of seeing Ms. Karli Vee in my office today for her abnormal mammogram.  I am including a copy of my office visit today. If you have questions, please do not hesitate to call me. I look forward to following Ms. Jacek Singh along with you and will keep you updated as to her progress. Sincerely, Rain Gupta MD

## 2020-12-01 DIAGNOSIS — M72.2 PLANTAR FASCIITIS, LEFT: ICD-10-CM

## 2020-12-01 DIAGNOSIS — M79.672 LEFT FOOT PAIN: ICD-10-CM

## 2020-12-01 RX ORDER — MELOXICAM 15 MG/1
15 TABLET ORAL
Qty: 30 TAB | Refills: 0 | Status: SHIPPED | OUTPATIENT
Start: 2020-12-01 | End: 2021-10-22

## 2020-12-01 NOTE — TELEPHONE ENCOUNTER
Last Visit: 10/20/20 with MD Toño Hernandes Next Appointment: Advised to follow-up after MRI/CT Previous Refill Encounter(s): 10/20/20 #30 Requested Prescriptions Pending Prescriptions Disp Refills  meloxicam (Mobic) 15 mg tablet 30 Tab 0 Sig: Take 1 Tab by mouth daily (with breakfast).

## 2021-01-20 ENCOUNTER — TELEPHONE (OUTPATIENT)
Dept: FAMILY MEDICINE CLINIC | Age: 51
End: 2021-01-20

## 2021-01-20 NOTE — TELEPHONE ENCOUNTER
Have you been diagnosed with, tested for, or told that you are suspected of having COVID-19 (coronovirus)? Have you had a fever or taken medication to treat a fever in the past 72 hours? Have you had a cough, SOB, or flu-like symptoms within the past 3 days? Have you had direct contact with someone who tested positive for COVID-19 within the past 14 days? Do you have a household member with flu-like symptoms, including fever, cough, or SOB? Do you currently have flu-like symptoms including fever, cough, or SOB? Are you experiencing new loss of taste or smell?  
 
 
 
 
ALL ANSWERS TO THE ABOVE QUESTIONS IS NO

## 2021-01-21 ENCOUNTER — OFFICE VISIT (OUTPATIENT)
Dept: FAMILY MEDICINE CLINIC | Age: 51
End: 2021-01-21
Payer: COMMERCIAL

## 2021-01-21 VITALS
DIASTOLIC BLOOD PRESSURE: 86 MMHG | TEMPERATURE: 98.7 F | BODY MASS INDEX: 48.76 KG/M2 | RESPIRATION RATE: 18 BRPM | SYSTOLIC BLOOD PRESSURE: 138 MMHG | HEIGHT: 62 IN | OXYGEN SATURATION: 99 % | HEART RATE: 73 BPM | WEIGHT: 265 LBS

## 2021-01-21 DIAGNOSIS — M72.2 PLANTAR FASCIITIS: ICD-10-CM

## 2021-01-21 DIAGNOSIS — L30.9 ECZEMA, UNSPECIFIED TYPE: ICD-10-CM

## 2021-01-21 DIAGNOSIS — R60.0 LOWER EXTREMITY EDEMA: ICD-10-CM

## 2021-01-21 DIAGNOSIS — M79.606 PAIN OF LOWER EXTREMITY, UNSPECIFIED LATERALITY: ICD-10-CM

## 2021-01-21 DIAGNOSIS — I10 ESSENTIAL HYPERTENSION: Primary | ICD-10-CM

## 2021-01-21 DIAGNOSIS — Z12.11 COLON CANCER SCREENING: ICD-10-CM

## 2021-01-21 PROCEDURE — 99214 OFFICE O/P EST MOD 30 MIN: CPT | Performed by: FAMILY MEDICINE

## 2021-01-21 RX ORDER — IBUPROFEN 800 MG/1
800 TABLET ORAL
Qty: 90 TAB | Refills: 0 | Status: SHIPPED | OUTPATIENT
Start: 2021-01-21 | End: 2022-04-27 | Stop reason: ALTCHOICE

## 2021-01-21 RX ORDER — TRIAMCINOLONE ACETONIDE 1 MG/G
OINTMENT TOPICAL 2 TIMES DAILY
Qty: 2270 G | Refills: 0 | Status: SHIPPED | OUTPATIENT
Start: 2021-01-21 | End: 2021-01-27 | Stop reason: SDUPTHER

## 2021-01-21 RX ORDER — HYDROCHLOROTHIAZIDE 25 MG/1
25 TABLET ORAL DAILY
Qty: 90 TAB | Refills: 1 | Status: SHIPPED | OUTPATIENT
Start: 2021-01-21 | End: 2021-01-27 | Stop reason: SDUPTHER

## 2021-01-21 NOTE — PATIENT INSTRUCTIONS
Please call to schedule with: 
 
[address] [facility] [address] Te Strickland [address] [facility], [address] 
[phone]

## 2021-01-21 NOTE — PROGRESS NOTES
1. Have you been to the ER, urgent care clinic since your last visit? Hospitalized since your last visit? No    2. Have you seen or consulted any other health care providers outside of the [address] [place] [address] since your last visit? Include any pap smears or colon screening.  No

## 2021-01-22 NOTE — PROGRESS NOTES
Chief Complaint   Patient presents with    Foot Pain     c/o heel pain     Other     c/o inner thigh pain      SUBJECTIVE    Patient presents once again for leg pain. She has seen orthopedics and was advised that she had plantar fasciitis and a knee mass. She was referred for an MRI and was to follow-up. She did not follow through and she is here for the same complaints she saw orthopedics for. She says she has heel and leg pain. She is still a challenged historian. She says she does not want to see any of her specialists. She has not scheduled with OB/GYN. She has not seen derm. She insists that I refill her medications for eczema. Wants refills of her HCTZ which she takes for HTN. She has completed her labs which were overdue. She had an abnormal mammogram and does not know the outcome. I reviewed the chart and see that she saw our breast surgeon. She was advised on repeat testing in May which I reminded her. OBJECTIVE    Blood pressure 138/86, pulse 73, temperature 98.7 °F (37.1 °C), temperature source Oral, resp. rate 18, height 5' 2\" (1.575 m), weight 265 lb (120.2 kg), last menstrual period 01/20/2021, SpO2 99 %. General:  Alert, semi-cooperative, well appearing, in no apparent distress. CV:  The heart sounds are regular in rate and rhythm. There is a normal S1 and S2. There or no murmurs. Lungs: Inspiratory and expiratory efforts are full and unlabored. Lung sounds are clear. Psych: normal affect. Mood good. Oriented x 3. Judgement and insight intact. ASSESSMENT / PLAN    ICD-10-CM ICD-9-CM    1. Essential hypertension  I10 401.9 hydroCHLOROthiazide (HYDRODIURIL) 25 mg tablet   2. Lower extremity edema  R60.0 782.3 hydroCHLOROthiazide (HYDRODIURIL) 25 mg tablet   3. Eczema, unspecified type  L30.9 692.9 triamcinolone acetonide (KENALOG) 0.1 % ointment   4. Body mass index (BMI) 45.0-49.9, adult ([facility])  Z68.42 V85.42    5.  Colon cancer screening  Z12.11 V76.51 REFERRAL TO GASTROENTEROLOGY   6. Pain of lower extremity, unspecified laterality  M79.606 729.5    7. Plantar fasciitis  M72.2 728.71      HTN with LE edema - refills of meds. Reviewed labs. Controlled. Had seen vascular for leg edema and seems that HCTZ works well. Serious obesity - based on co-morbidities. Healthy living habits endorsed. Eczema - She insisting I refill her topical steroid. Advised on longterm permanent risks of use. Leg and foot pain - reviewed ortho notes. I also reviewed my prior notes where she declined my treatment advice. Since she has a work-up ordered by ortho, I recommend that she sees that through. I do not have a role in this at this time and she needs to continue per the treatment plan with the specialist.      Referred for colon cancer screening. Also advised she sees OB/GYN annually. All chart history elements were reviewed by me at the time of the visit even though marked at time of note closure. Patient understands our medical plan. Patient has provided input and agrees with goals. Alternatives have been explained and offered. All questions answered. The patient is to call if condition worsens or fails to improve. Follow-up and Dispositions    · Return in about 6 months (around 7/21/2021) for routine care.

## 2021-01-27 DIAGNOSIS — M72.2 PLANTAR FASCIITIS, LEFT: ICD-10-CM

## 2021-01-27 DIAGNOSIS — M79.672 LEFT FOOT PAIN: ICD-10-CM

## 2021-01-27 DIAGNOSIS — I10 ESSENTIAL HYPERTENSION: ICD-10-CM

## 2021-01-27 DIAGNOSIS — L30.9 ECZEMA, UNSPECIFIED TYPE: ICD-10-CM

## 2021-01-27 DIAGNOSIS — R60.0 LOWER EXTREMITY EDEMA: ICD-10-CM

## 2021-01-28 RX ORDER — IBUPROFEN 800 MG/1
800 TABLET ORAL
Qty: 90 TAB | Refills: 0 | OUTPATIENT
Start: 2021-01-28

## 2021-01-28 RX ORDER — MELOXICAM 15 MG/1
15 TABLET ORAL
Qty: 30 TAB | Refills: 0 | OUTPATIENT
Start: 2021-01-28

## 2021-01-28 RX ORDER — HYDROCHLOROTHIAZIDE 25 MG/1
25 TABLET ORAL DAILY
Qty: 90 TAB | Refills: 1 | Status: SHIPPED | OUTPATIENT
Start: 2021-01-28 | End: 2021-09-15 | Stop reason: SDUPTHER

## 2021-01-28 RX ORDER — TRIAMCINOLONE ACETONIDE 1 MG/G
OINTMENT TOPICAL 2 TIMES DAILY
Qty: 2270 G | Refills: 0 | Status: SHIPPED | OUTPATIENT
Start: 2021-01-28 | End: 2021-10-22 | Stop reason: ALTCHOICE

## 2021-01-28 NOTE — TELEPHONE ENCOUNTER
Patient cannot be on two NSAIDs. She has to choose whether she wants to be on Ibuprofen or the Mobic. I advise that she sees orthopedics and gets the mobic from them due to her leg pain. Others are sent in.

## 2021-01-28 NOTE — TELEPHONE ENCOUNTER
Spoke with Robert Escobar to inform her that Dr. Tavon Mascorro states that she cannot be on two NSAID at the same time , she has to choose whether she wants to be on Ibuprofen or the Mobic and that  he advise that she sees orthopedics and gets the mobic from them due to her leg pain. Robert Escobar voice understanding and is aware all other meds have been e-scribed to her mail order pharmacy.

## 2021-02-16 ENCOUNTER — HOSPITAL ENCOUNTER (OUTPATIENT)
Age: 51
Discharge: HOME | End: 2021-02-16
Attending: ORTHOPAEDIC SURGERY
Payer: COMMERCIAL

## 2021-02-16 PROCEDURE — 73718 MRI LOWER EXTREMITY W/O DYE: CPT

## 2021-02-16 PROCEDURE — 73721 MRI JNT OF LWR EXTRE W/O DYE: CPT

## 2021-02-17 ENCOUNTER — OFFICE VISIT (OUTPATIENT)
Dept: ORTHOPEDIC SURGERY | Age: 51
End: 2021-02-17
Payer: COMMERCIAL

## 2021-02-17 VITALS
WEIGHT: 264 LBS | RESPIRATION RATE: 16 BRPM | HEART RATE: 94 BPM | TEMPERATURE: 96.4 F | HEIGHT: 62 IN | SYSTOLIC BLOOD PRESSURE: 141 MMHG | OXYGEN SATURATION: 97 % | BODY MASS INDEX: 48.58 KG/M2 | DIASTOLIC BLOOD PRESSURE: 62 MMHG

## 2021-02-17 DIAGNOSIS — M72.2 PLANTAR FASCIITIS, LEFT: Primary | ICD-10-CM

## 2021-02-17 DIAGNOSIS — M62.838 MUSCLE SPASM: ICD-10-CM

## 2021-02-17 DIAGNOSIS — M84.362A STRESS FRACTURE OF LEFT TIBIA, INITIAL ENCOUNTER: ICD-10-CM

## 2021-02-17 DIAGNOSIS — M79.672 LEFT FOOT PAIN: ICD-10-CM

## 2021-02-17 PROCEDURE — 99213 OFFICE O/P EST LOW 20 MIN: CPT | Performed by: ORTHOPAEDIC SURGERY

## 2021-02-17 RX ORDER — FAMOTIDINE 40 MG/1
40 TABLET, FILM COATED ORAL DAILY
Qty: 30 TAB | Refills: 0 | Status: SHIPPED | OUTPATIENT
Start: 2021-02-17 | End: 2022-09-13

## 2021-02-17 RX ORDER — MELOXICAM 15 MG/1
15 TABLET ORAL DAILY
Qty: 30 TAB | Refills: 0 | Status: SHIPPED | OUTPATIENT
Start: 2021-02-17 | End: 2022-04-27 | Stop reason: SDUPTHER

## 2021-02-17 RX ORDER — CYCLOBENZAPRINE HCL 10 MG
10 TABLET ORAL
Qty: 30 TAB | Refills: 1 | Status: SHIPPED | OUTPATIENT
Start: 2021-02-17 | End: 2021-10-22

## 2021-02-17 NOTE — LETTER
2/17/2021 Patient: Reyna Xiao YOB: 1970 Date of Visit: 2/17/2021 Leander Klinefelter, MD 
[address] [facility] [address] [id number] [place] [address] [address] [employer] Dear Leander Klinefelter, MD, Thank you for referring Ms. Edson Pineda to [address] [place] [address] for evaluation. My notes for this consultation are attached. If you have questions, please do not hesitate to call me. I look forward to following your patient along with you.  
 
 
Sincerely, 
 
Mandie Loza MD

## 2021-02-17 NOTE — PROGRESS NOTES
AMBULATORY PROGRESS NOTE      Patient: Cabrera Lee             MRN: [id number]     SSN: [id number] Body mass index is 48.29 kg/m². YOB: 1970     AGE: 48 y.o. EX: female    PCP: Kalyn Reyes MD       IMPRESSION //  DIAGNOSIS AND TREATMENT PLAN      DIAGNOSES  1. Plantar fasciitis, left    2. Left foot pain    3. Stress fracture of left tibia, initial encounter    4. Muscle spasm        Orders Placed This Encounter    AMB SUPPLY ORDER     Visco heel insert, left    VT CLOSED TX TIBIA SHAFT FX    meloxicam (Mobic) 15 mg tablet     Sig: Take 1 Tab by mouth daily. Dispense:  30 Tab     Refill:  0    famotidine (PEPCID) 40 mg tablet     Sig: Take 1 Tab by mouth daily. Dispense:  30 Tab     Refill:  0    cyclobenzaprine (FLEXERIL) 10 mg tablet     Sig: Take 1 Tab by mouth three (3) times daily as needed for Muscle Spasm(s). Dispense:  30 Tab     Refill:  1          Cabrera Lee has has tenderness to central portion of left calcaneus, with some mild plan fasciitis. Her left knee pain, and anteromedial tibial plateau pain, that she had few weeks ago, is now completely resolved. She now reports having some discomfort, to the anterior medial proximal to mid portions of her thigh only when she starts up walking, the first few steps and then it goes away. I am uncertain of this, the cause of this. There is no history of wound arthritis lupus psoriasis and/or inflamed arthritis. She is extremity motion of her left hip, I suspect no stress fracture    PLAN:    1. Mobic 15 mg daily and Pepcid 40 mg daily  2. Visco heel insert  3. Flexeril prescription sent to her pharmacy, for her left medial thigh tightness and intermittent tenderness (possible spasms). RTO-  3 weeks reassess the left medial thigh and left foot heel and , left knee.       HPI //  OBJECTIVE EXAMINATION        Cabrera Lee IS A 48 y.o. female who presents to my outpatient office for evaluation of: left foot/leg pain    Since I saw her last, she reports that her left anteromedial knee pain has resolved, she had an MRI that showed some stress changes on the anterior medial border of her tibia no full-thickness no full fracture. But she states this is completely resolved. She now reports having tenderness and discomfort in central portion of left calcaneus, as well as to the medial thigh. Virgil Andrade (1970) is a 48 y.o. female, established patient, here for evaluation of the following chief complaint(s):    Chief Complaint   Patient presents with    Leg Pain     left leg pain       Patient reports no pain in her left leg, but endorses worsening pain in the bottom of her foot and medial thigh pain. Patient states that she has pain with position changes from sit-to- her thigh and it causes her to limp. This medial thigh discomfort, last for the first few steps when she walks, then gradually goes away on its in its entirety. Visit Vitals  BP (!) 141/62 (BP 1 Location: Right arm, BP Patient Position: Sitting)   Pulse 94   Temp (!) 96.4 °F (35.8 °C) (Temporal)   Resp 16   Ht 5' 2\" (1.575 m)   Wt 264 lb (119.7 kg)   LMP 01/20/2021   SpO2 97%   BMI 48.29 kg/m²       Appearance: Alert, well appearing and pleasant patient who is in no distress, oriented to person, place/time, and who follows commands. This patient is accompanied in the examination room by her  self. There is no signs of: no dementia  Psychiatric: Affect and mood are appropriate. Patient arrives to office via: without assistive device  H EENT (2): Head normocephalic & atraumatic.  Both pupils are round, non icteric sclera     Eye: EOM are intact // Neck: ROM WNL  //  Hearings Intact   Respiratory: Breathing is unlabored without accessory chest muscle use    ANKLE/FOOT left    Psychiatry: Alert, oriented x 3 (name,place,time of day); speech normal in context and clarity, memory intact grossly, no involuntary movements - tremors, no dementia  Gait: normal  Tenderness: mild central portion calcaneous plantar. Plantar fascia non-tender. Cutaneous: WNL  Joint Motion: WNL  Joint / Tendon Stability: No Ankle or Subtalar instability or joint laxity. No peroneal sublux ability or dislocation  Alignment: neutral Hindfoot,    Neuro Motor/Sensory: NL/NL,  Vascular: NL foot/ankle pulses,   Lymphatics: No extremity lymphedema, No calf swelling, no tenderness to calf muscles. HIP REGION: left    Gait normal  Cutaneous: Skin intact, redness not present, erythema not present,drainage not present , rash not present  Visible swelling: not present  ROM: Normal IR, Normal ER   Normal FLEXION, Normal EXTENSION   Stability: none noted  Effusion: difficult to assess due to soft tissues  Tenderness: to Groin not present, GT region not present, to distal femur not present   Fullness: Popliteal region not present  Deformity: not present    Knees: left       Cutaneous: Skin intact, no abrasions, blisters, wounds, erythema       Effusion: Is not present       Crepitus:  no PF joint crepitus       Tenderness: Medial joint line none, Lateral joint line none , MCL none, LCL none,         Patellar tendon/Quadriceps tendon none       Alignment of Knee: mild valgus when standing       ROM: full range of motion       Fullness or swelling: None to popliteal fossa region,         Stability: No instability to anterior, posterior, varus, valgus stress testing       Thrust:   No varus thrust with gait        Neuro: knee flexion, knee extension, plantar flexion and plantar dorsiflexion and Extensor mechanism is intact            CHART REVIEW     Darien Pollard has been experiencing pain and discomfort confirmed as outlined in the pain assessment outlined below.       Pain Assessment  2/17/2021   Location of Pain Leg   Location Modifiers Left   Severity of Pain 6   Quality of Pain Aching   Duration of Pain -   Frequency of Pain Intermittent   Date Pain First Started -   Aggravating Factors (No Data)   Aggravating Factors Comment sitting long period of time   Limiting Behavior No   Relieving Factors NSAID   Result of Injury No        Hieuluis Grant  has a past medical history of Benign essential hypertension, Eczema, and Environmental allergies. Patients is employed at:         Past Medical History:   Diagnosis Date    Benign essential hypertension     Eczema     Environmental allergies      Past Surgical History:   Procedure Laterality Date    HX BREAST BIOPSY Left 6/16/2016    LEFT BREAST NEEDLE LOCALIZED BIOPSY performed by Lulu Cooper MD at [place] [address]     Current Outpatient Medications   Medication Sig    meloxicam (Mobic) 15 mg tablet Take 1 Tab by mouth daily.  famotidine (PEPCID) 40 mg tablet Take 1 Tab by mouth daily.  cyclobenzaprine (FLEXERIL) 10 mg tablet Take 1 Tab by mouth three (3) times daily as needed for Muscle Spasm(s).  hydroCHLOROthiazide (HYDRODIURIL) 25 mg tablet Take 1 Tab by mouth daily.  triamcinolone acetonide (KENALOG) 0.1 % ointment Apply  to affected area two (2) times a day. use thin layer    ibuprofen (MOTRIN) 800 mg tablet Take 1 Tab by mouth every eight (8) hours as needed for Pain.  meloxicam (Mobic) 15 mg tablet Take 1 Tab by mouth daily (with breakfast).  famotidine (PEPCID) 40 mg tablet Take 1 Tab by mouth daily. No current facility-administered medications for this visit. Allergies   Allergen Reactions    Latex Hives     Social History     Occupational History    Not on file   Tobacco Use    Smoking status: Never Smoker    Smokeless tobacco: Never Used   Substance and Sexual Activity    Alcohol use:  Yes     Alcohol/week: 2.0 - 3.0 standard drinks     Types: 1 Glasses of wine, 1 - 2 Standard drinks or equivalent per week     Frequency: 2-3 times a week     Comment: 2-3 times per week    Drug use: No    Sexual activity: Yes Partners: Male     Family History   Problem Relation Age of Onset    Cancer Mother         unsure what kind    Hypertension Father     Heart Attack Father         DIAGNOSTIC LAB DATA      Lab Results   Component Value Date/Time    Hemoglobin A1c 5.7 (H) 05/15/2019 08:02 AM    Hemoglobin A1c 5.8 (H) 07/31/2018 10:11 AM    //   Lab Results   Component Value Date/Time    Glucose 103 (H) 10/12/2020 09:30 AM        No results found for: HYQ1PVZA, WES7QIRH      No results found for: VITD3, XQVID2, XQVID3, XQVID, VD3RIA, [id number]      REVIEW OF SYSTEMS : 2/17/2021  ALL BELOW ARE Negative except : SEE HPI     All other systems reviewed and are negative. 12 point review of systems otherwise negative unless noted in HPI. DIAGNOSTIC IMAGING      MRI Results (most recent):  Results from Orders Only encounter on 10/21/20   MRI TIB/FIB LT WO CONT    Narrative MRI TIB/FIB LT WO CONT: 2/16/2021 1:17 PM    CLINICAL INFORMATION  Lower extremity pain. History of subcutaneous knee mass. .    COMPARISON  Left knee MRI February 16, 2021    TECHNIQUE  Multiplanar MR images obtained of the left tibia/fibula utilizing T1 and  T2-weighted sequences. FINDINGS   There is mild periosteal edema along the anterior/medial aspect of the tibial  diaphyseal cortex compatible with low-grade stress-related changes. No abnormal marrow signal intensity. Neurovascular bundles: Normal course and caliber. Normal muscle bulk and signal      Impression Grade 1 stress related changes of the anterior medial tibia. I have reviewed the results of the above study. The interpretation of this study is my professional opinion.       Ivett Jensen MD  2/17/2021  11:28 AM

## 2021-04-26 DIAGNOSIS — M84.362A STRESS FRACTURE OF LEFT TIBIA, INITIAL ENCOUNTER: ICD-10-CM

## 2021-04-26 DIAGNOSIS — M72.2 PLANTAR FASCIITIS, LEFT: ICD-10-CM

## 2021-04-26 DIAGNOSIS — M79.672 LEFT FOOT PAIN: ICD-10-CM

## 2021-04-26 RX ORDER — MELOXICAM 15 MG/1
15 TABLET ORAL DAILY
Qty: 30 TAB | Refills: 0 | OUTPATIENT
Start: 2021-04-26

## 2021-04-26 NOTE — TELEPHONE ENCOUNTER
Patient's request for medication refill has been denied. Patient will need a follow up visit prior to any further refills. uJdy Paul [facility], MPA, PA-C 
4/26/2021  
1:14 PM

## 2021-04-26 NOTE — TELEPHONE ENCOUNTER
Last Visit: 2/17/21 with MD Lindsey Walsh Next Appointment: none Previous Refill Encounter(s): 2/17/21 330 Requested Prescriptions Pending Prescriptions Disp Refills  meloxicam (Mobic) 15 mg tablet 30 Tab 0 Sig: Take 1 Tab by mouth daily.

## 2021-05-10 ENCOUNTER — TELEPHONE (OUTPATIENT)
Dept: SURGERY | Age: 51
End: 2021-05-10

## 2021-05-10 DIAGNOSIS — R92.8 ABNORMAL MAMMOGRAM: Primary | ICD-10-CM

## 2021-05-10 DIAGNOSIS — R92.8 ABNORMAL MAMMOGRAM: ICD-10-CM

## 2021-09-10 DIAGNOSIS — I10 ESSENTIAL HYPERTENSION: ICD-10-CM

## 2021-09-10 DIAGNOSIS — R60.0 LOWER EXTREMITY EDEMA: ICD-10-CM

## 2021-09-10 RX ORDER — HYDROCHLOROTHIAZIDE 25 MG/1
TABLET ORAL
Qty: 90 TABLET | Refills: 1 | OUTPATIENT
Start: 2021-09-10

## 2021-09-10 NOTE — TELEPHONE ENCOUNTER
No-show. Needs appt to keep on meds. Also it is unclear if she was changing to Dr. Mark Schulte as her appt was with Dr. Mark Schulte.

## 2021-09-10 NOTE — TELEPHONE ENCOUNTER
ELIZABETH Boo attempted to contact patient but there was no answer and VM box is full. No-show letter sent.

## 2021-09-15 DIAGNOSIS — R60.0 LOWER EXTREMITY EDEMA: ICD-10-CM

## 2021-09-15 DIAGNOSIS — I10 ESSENTIAL HYPERTENSION: ICD-10-CM

## 2021-09-15 RX ORDER — HYDROCHLOROTHIAZIDE 25 MG/1
25 TABLET ORAL DAILY
Qty: 30 TABLET | Refills: 1 | Status: SHIPPED | OUTPATIENT
Start: 2021-09-15 | End: 2021-10-22 | Stop reason: SDUPTHER

## 2021-09-15 NOTE — TELEPHONE ENCOUNTER
Patient transferring from Dr. Clare Simpson to Dr. Chris Morfin as she feels more comfortable with a female provider. Patient has an appointment for 10/22 and wants to know if her medication can be refilled until then. Patient was erroneously placed on schedule on 9/9 as she told PSR shew would not be able to make it.

## 2021-10-22 ENCOUNTER — OFFICE VISIT (OUTPATIENT)
Dept: FAMILY MEDICINE CLINIC | Age: 51
End: 2021-10-22
Payer: COMMERCIAL

## 2021-10-22 VITALS
WEIGHT: 266 LBS | DIASTOLIC BLOOD PRESSURE: 64 MMHG | SYSTOLIC BLOOD PRESSURE: 126 MMHG | TEMPERATURE: 97.2 F | HEIGHT: 62 IN | RESPIRATION RATE: 16 BRPM | OXYGEN SATURATION: 97 % | HEART RATE: 94 BPM | BODY MASS INDEX: 48.95 KG/M2

## 2021-10-22 DIAGNOSIS — Z12.11 SCREENING FOR COLON CANCER: ICD-10-CM

## 2021-10-22 DIAGNOSIS — E78.5 DYSLIPIDEMIA: ICD-10-CM

## 2021-10-22 DIAGNOSIS — I10 ESSENTIAL HYPERTENSION: Primary | ICD-10-CM

## 2021-10-22 DIAGNOSIS — Z23 NEEDS FLU SHOT: ICD-10-CM

## 2021-10-22 DIAGNOSIS — R92.8 ABNORMAL MAMMOGRAM: ICD-10-CM

## 2021-10-22 DIAGNOSIS — M72.2 PLANTAR FASCIITIS, LEFT: ICD-10-CM

## 2021-10-22 DIAGNOSIS — R60.0 LOWER EXTREMITY EDEMA: ICD-10-CM

## 2021-10-22 DIAGNOSIS — R73.01 IMPAIRED FASTING GLUCOSE: ICD-10-CM

## 2021-10-22 DIAGNOSIS — M79.672 LEFT FOOT PAIN: ICD-10-CM

## 2021-10-22 DIAGNOSIS — D50.8 OTHER IRON DEFICIENCY ANEMIA: ICD-10-CM

## 2021-10-22 PROCEDURE — 90471 IMMUNIZATION ADMIN: CPT | Performed by: FAMILY MEDICINE

## 2021-10-22 PROCEDURE — 90686 IIV4 VACC NO PRSV 0.5 ML IM: CPT | Performed by: FAMILY MEDICINE

## 2021-10-22 PROCEDURE — 99214 OFFICE O/P EST MOD 30 MIN: CPT | Performed by: FAMILY MEDICINE

## 2021-10-22 RX ORDER — MELOXICAM 15 MG/1
15 TABLET ORAL
Qty: 30 TABLET | Refills: 0 | Status: CANCELLED | OUTPATIENT
Start: 2021-10-22

## 2021-10-22 RX ORDER — TRIAMCINOLONE ACETONIDE 0.25 MG/G
CREAM TOPICAL 2 TIMES DAILY
Qty: 15 G | Refills: 0 | Status: SHIPPED | OUTPATIENT
Start: 2021-10-22 | End: 2021-10-22 | Stop reason: ALTCHOICE

## 2021-10-22 RX ORDER — TRIAMCINOLONE ACETONIDE 0.25 MG/G
CREAM TOPICAL 2 TIMES DAILY
Qty: 80 G | Refills: 0 | Status: SHIPPED | OUTPATIENT
Start: 2021-10-22 | End: 2021-10-27 | Stop reason: SDUPTHER

## 2021-10-22 RX ORDER — HYDROCHLOROTHIAZIDE 25 MG/1
25 TABLET ORAL DAILY
Qty: 90 TABLET | Refills: 1 | Status: SHIPPED | OUTPATIENT
Start: 2021-10-22 | End: 2022-03-22 | Stop reason: SDUPTHER

## 2021-10-22 NOTE — PROGRESS NOTES
HISTORY OF PRESENT ILLNESS  Celia Noe is a 46 y.o. female. HPI: Here as a new patient to me. Switch from Dr. Elliot Raman as needed the female provider  History of hypertension. Vitals been stable. Taking medication with compliance and no side effects. Asymptomatic during the visit. Dyslipidemia. Again working on diet modification. Not on any medication. Chronic left foot pain. Used to see . I have advised to make a follow-up appointment. Pain is much milder in intensity. No swelling or redness. Able to bear weight. No recent fall or injury. Iron deficiency anemia. Following hematology. Had received iron infusion. Currently asymptomatic. No unusual fatigue. Currently no leg swelling. Taking their medication with compliance and no side effects. Abnormal mammogram. Overdue for follow-up or diagnostic mammogram and ultrasound. No concern on self breast exam. Following Dr. Gertrude Knight. At this time advised to make a follow-up appointment with them. Visit Vitals  /64 (BP 1 Location: Right upper arm, BP Patient Position: Sitting, BP Cuff Size: Adult)   Pulse 94   Temp 97.2 °F (36.2 °C) (Temporal)   Resp 16   Ht 5' 2\" (1.575 m)   Wt 266 lb (120.7 kg)   SpO2 97%   BMI 48.65 kg/m²     Review medication list, vitals, problem list,allergies. Denies any headache, dizziness, no chest pain or trouble breathing, no arm or leg weakness. No nausea or vomiting, no weight or appetite changes, no mood changes . No urine or bowel complains, no palpitation, no diaphoresis. No abdominal pain. No cold or cough. No leg swelling. No fever. No sleep trouble.      Lab Results   Component Value Date/Time    WBC 4.6 10/12/2020 09:30 AM    HGB 11.7 (L) 10/12/2020 09:30 AM    HCT 36.9 10/12/2020 09:30 AM    PLATELET 547 71/93/7669 09:30 AM    MCV 86.0 10/12/2020 09:30 AM     Lab Results   Component Value Date/Time    Sodium 143 10/12/2020 09:30 AM    Potassium 3.9 10/12/2020 09:30 AM    Chloride 107 10/12/2020 09:30 AM    CO2 31 10/12/2020 09:30 AM    Anion gap 5 10/12/2020 09:30 AM    Glucose 103 (H) 10/12/2020 09:30 AM    BUN 12 10/12/2020 09:30 AM    Creatinine 0.77 10/12/2020 09:30 AM    BUN/Creatinine ratio 16 10/12/2020 09:30 AM    GFR est AA >60 10/12/2020 09:30 AM    GFR est non-AA >60 10/12/2020 09:30 AM    Calcium 8.7 10/12/2020 09:30 AM    Bilirubin, total 0.3 10/12/2020 09:30 AM    Alk. phosphatase 57 10/12/2020 09:30 AM    Protein, total 7.1 10/12/2020 09:30 AM    Albumin 3.4 10/12/2020 09:30 AM    Globulin 3.7 10/12/2020 09:30 AM    A-G Ratio 0.9 10/12/2020 09:30 AM    ALT (SGPT) 25 10/12/2020 09:30 AM    AST (SGOT) 19 10/12/2020 09:30 AM     Lab Results   Component Value Date/Time    Cholesterol, total 241 (H) 10/12/2020 09:30 AM    HDL Cholesterol 59 10/12/2020 09:30 AM    LDL, calculated 160.4 (H) 10/12/2020 09:30 AM    VLDL, calculated 21.6 10/12/2020 09:30 AM    Triglyceride 108 10/12/2020 09:30 AM    CHOL/HDL Ratio 4.1 10/12/2020 09:30 AM     Lab Results   Component Value Date/Time    TSH 1.90 07/31/2018 10:11 AM     Lab Results   Component Value Date/Time    Hemoglobin A1c 5.7 (H) 05/15/2019 08:02 AM             ROS: See HPI    Physical Exam  Constitutional:       General: She is not in acute distress. Cardiovascular:      Rate and Rhythm: Normal rate and regular rhythm. Heart sounds: Normal heart sounds. Abdominal:      General: Bowel sounds are normal.      Palpations: Abdomen is soft. Tenderness: There is no abdominal tenderness. Musculoskeletal:         General: No swelling. Cervical back: Neck supple. Neurological:      Mental Status: She is oriented to person, place, and time. Psychiatric:         Behavior: Behavior normal.         ASSESSMENT and PLAN    ICD-10-CM ICD-9-CM    Been following 1. Essential hypertension :well controlled. Continue current dose of medication and low salt diet. Exercise as tolerated.    I10 401.9 hydroCHLOROthiazide (HYDRODIURIL) 25 mg tablet      METABOLIC PANEL, COMPREHENSIVE   2. Left foot pain: Chronic in nature. Used to see Dr. Maximiliano Reece. Currently taking OTC medication as needed. Advised to make a follow-up appointment. No swelling or redness at this time. Pain is mild in intensity. Able to bear weight. Not using any support during walking M79.672 729.5    3. Plantar fasciitis, left  M72.2 728.71    4. Lower extremity edema: Fairly stable at this time. No pitting edema during visit R60.0 782.3 hydroCHLOROthiazide (HYDRODIURIL) 25 mg tablet   5. Needs flu shot  Z23 V04.81 INFLUENZA VIRUS VAC QUAD,SPLIT,PRESV FREE SYRINGE IM   6. Impaired fasting glucose: Discussed diet modification and lifestyle modification. Recheck labs R73.01 790.21 HEMOGLOBIN A1C WITH EAG   7. Abnormal mammogram: Right breast abnormal mammogram. She is supposed to have diagnostic mammogram in 6 months which is overdue. Seeing Dr. Deven Oliva. At this time advised to make a follow-up appointment but as per patient request ordered the diagnostic mammogram and ultrasound. On a self breast exam no palpable abnormality per patient R92.8 793.80 BASSEM MAMMO RT DX INCL CAD      US BREAST RT LIMITED=<3 QUAD   8. Other iron deficiency anemia: Following hematology and the recommendation D50.8 280.8 CBC W/O DIFF   9. Dyslipidemia: Not on statin. Discussed the lifestyle and diet modification. Recheck labs E78.5 272.4 LIPID PANEL   10. Screening for colon cancer  Z12.11 V76.51 REFERRAL TO GASTROENTEROLOGY   She has a generalized rash on and off. Saadia Decree going on since she was a child. Has seen the dermatology and allergy specialist in the past. Currently no rash but asking for triamcinolone cream as needed. She uses it 4-5 times in a month. I have advised her to keep the log and if any rest. She can show the pictures. If needed we can do the referral to allergy specialist again and see if any allergy shots needs to be done or carry EpiPen. She will think about it the referral and also will keep the log.     Patient understood and agreed with the plan  Has done the GI referral in the past for colon cancer screening but not seeing them. Done a new referral  See regarding the mammogram above  Flu shot taken today  She will take the Tdap next visit  Has not done the Covid shot yet but decided to get it I have advised her to get it from the local pharmacy  Follow-up and Dispositions    · Return in about 3 months (around 1/22/2022). Please note that this dictation was completed with [employer]ZE, the computer voice recognition software. Quite often unanticipated grammatical, syntax, homophones, and other interpretive errors are inadvertently transcribed by the computer software. Please disregard these errors. Please excuse any errors that have escaped final proofreading.

## 2021-10-22 NOTE — PROGRESS NOTES
1. Have you been to the ER, urgent care clinic since your last visit? Hospitalized since your last visit? No    2. Have you seen or consulted any other health care providers outside of the [address] [place] [address] since your last visit? Include any pap smears or colon screening. No    Chief Complaint   Patient presents with    Blood sugar problem    Hypertension    Medication Evaluation     wants a Rx for Triamcinolone Cream instead of the ointment.

## 2021-10-22 NOTE — PATIENT INSTRUCTIONS
Vaccine Information Statement    Influenza (Flu) Vaccine (Inactivated or Recombinant): What You Need to Know    Many vaccine information statements are available in [place] and other languages. See www.immunize.org/vis. Hojas de información sobre vacunas están disponibles en español y en muchos otros idiomas. Visite www.immunize.org/vis. 1. Why get vaccinated? Influenza vaccine can prevent influenza (flu). Flu is a contagious disease that spreads around the [facility] every year, usually between October and May. Anyone can get the flu, but it is more dangerous for some people. Infants and young children, people 72 years and older, pregnant people, and people with certain health conditions or a weakened immune system are at greatest risk of flu complications. Pneumonia, bronchitis, sinus infections, and ear infections are examples of flu-related complications. If you have a medical condition, such as heart disease, cancer, or diabetes, flu can make it worse. Flu can cause fever and chills, sore throat, muscle aches, fatigue, cough, headache, and runny or stuffy nose. Some people may have vomiting and diarrhea, though this is more common in children than adults. In an average year, thousands of people in the [facility] die from flu, and many more are hospitalized. Flu vaccine prevents millions of illnesses and flu-related visits to the doctor each year. 2. Influenza vaccines     CDC recommends everyone 6 months and older get vaccinated every flu season. Children 6 months through 6years of age may need 2 doses during a single flu season. Everyone else needs only 1 dose each flu season. It takes about 2 weeks for protection to develop after vaccination. There are many flu viruses, and they are always changing. Each year a new flu vaccine is made to protect against the influenza viruses believed to be likely to cause disease in the upcoming flu season.  Even when the vaccine doesnt exactly match these viruses, it may still provide some protection. Influenza vaccine does not cause flu. Influenza vaccine may be given at the same time as other vaccines. 3. Talk with your health care provider    Tell your vaccination provider if the person getting the vaccine:   Has had an allergic reaction after a previous dose of influenza vaccine, or has any severe, life-threatening allergies    Has ever had Guillain-Barré Syndrome (also called GBS)    In some cases, your health care provider may decide to postpone influenza vaccination until a future visit. Influenza vaccine can be administered at any time during pregnancy. People who are or will be pregnant during influenza season should receive inactivated influenza vaccine. People with minor illnesses, such as a cold, may be vaccinated. People who are moderately or severely ill should usually wait until they recover before getting influenza vaccine. Your health care provider can give you more information. 4. Risks of a vaccine reaction     Soreness, redness, and swelling where the shot is given, fever, muscle aches, and headache can happen after influenza vaccination.  There may be a very small increased risk of Guillain-Barré Syndrome (GBS) after inactivated influenza vaccine (the flu shot). [facility] get the flu shot along with pneumococcal vaccine (PCV13) and/or DTaP vaccine at the same time might be slightly more likely to have a seizure caused by fever. Tell your health care provider if a child who is getting flu vaccine has ever had a seizure. People sometimes faint after medical procedures, including vaccination. Tell your provider if you feel dizzy or have vision changes or ringing in the ears. As with any medicine, there is a very remote chance of a vaccine causing a severe allergic reaction, other serious injury, or death. 5. What if there is a serious problem?     An allergic reaction could occur after the vaccinated person leaves the clinic. If you see signs of a severe allergic reaction (hives, swelling of the face and throat, difficulty breathing, a fast heartbeat, dizziness, or weakness), call 9-1-1 and get the person to the nearest hospital.    For other signs that concern you, call your health care provider. Adverse reactions should be reported to the Vaccine Adverse Event Reporting System (VAERS). Your health care provider will usually file this report, or you can do it yourself. Visit the VAERS website at www.vaers. [facility].gov or call 2-549.198.6880. VAERS is only for reporting reactions, and VAERS staff members do not give medical advice. 6. The National Vaccine Injury Compensation Program    The [facility] Vaccine Injury Compensation Program (VICP) is a federal program that was created to compensate people who may have been injured by certain vaccines. Claims regarding alleged injury or death due to vaccination have a time limit for filing, which may be as short as two years. Visit the VICP website at www.[facility].gov/vaccinecompensation or call 2-190.952.7856 to learn about the program and about filing a claim. 7. How can I learn more?  Ask your health care provider.  Call your local or state health department.  Visit the website of the Food and Drug Administration (FDA) for vaccine package inserts and additional information at www.fda.gov/vaccines-blood-biologics/vaccines.  Contact the Centers for Disease Control and Prevention (CDC):  - Call 4-753.948.8788 (1-800-CDC-INFO) or  - Visit CDCs influenza website at www.cdc.gov/flu. Vaccine Information Statement   Inactivated Influenza Vaccine   8/6/2021  [address] [facility] [place] [id number]   Department of Health and Human Services  Centers for Disease Control and Prevention    Office Use Only         Prediabetes: Care Instructions  Overview     Prediabetes is a warning sign that you're at risk for getting type 2 diabetes.  It means that your blood sugar is higher than it should be. But it's not high enough to be diabetes. The food you eat naturally turns into sugar. Your body uses the sugar for energy. Normally, an organ called the pancreas makes insulin. And insulin allows the sugar in your blood to get into your body's cells. But sometimes the body can't use insulin the right way. So the sugar stays in your blood instead. This is called insulin resistance. The buildup of sugar in your blood means you have prediabetes. The good news is that you may be able to prevent or delay diabetes. Making small lifestyle changes, like getting active and changing your eating habits, may help you get your blood sugar back to normal. You can work with your doctor to make a treatment plan. Follow-up care is a key part of your treatment and safety. Be sure to make and go to all appointments, and call your doctor if you are having problems. It's also a good idea to know your test results and keep a list of the medicines you take. How can you care for yourself at home? · Watch your weight. A healthy weight helps your body use insulin properly. · Limit the amount of calories, sweets, and unhealthy fat you eat. Ask your doctor if you should see a dietitian. A registered dietitian can help you create meal plans that fit your lifestyle. · Get at least 30 minutes of exercise on most days of the week. Exercise helps control your blood sugar. It also helps you maintain a healthy weight. Walking is a good choice. You also may want to do other activities, such as running, swimming, cycling, or playing tennis or team sports. · Do not smoke. Smoking can make prediabetes worse. If you need help quitting, talk to your doctor about stop-smoking programs and medicines. These can increase your chances of quitting for good. · If your doctor prescribed medicines, take them exactly as prescribed. Call your doctor if you think you are having a problem with your medicine.  You will get more details on the specific medicines your doctor prescribes. When should you call for help? Watch closely for changes in your health, and be sure to contact your doctor if:    · You have any symptoms of diabetes. These may include:  ? Being thirsty more often. ? Urinating more. ? Being hungrier. ? Losing weight. ? Being very tired. ? Having blurry vision.     · You have a wound that will not heal.     · You have an infection that will not go away.     · You have problems with your blood pressure.     · You want more information about diabetes and how you can keep from getting it. Where can you learn more? Go to http://www.gray.com/  Enter I222 in the search box to learn more about \"Prediabetes: Care Instructions. \"  Current as of: August 31, 2020               Content Version: 13.0  © 2006-2021 [employer]. Care instructions adapted under license by [employer] (which disclaims liability or warranty for this information). If you have questions about a medical condition or this instruction, always ask your healthcare professional. [address] any warranty or liability for your use of this information. Learning About Low-Fat Eating  What is low-fat eating? Most food has some fat in it. Your body needs some fat to be healthy. But some kinds of fats are healthier than others. In a low-fat eating plan, you try to choose healthier fats and eat fewer unhealthy fats. Healthy fats include olive and canola oil. Try to avoid eating too much saturated fat, such as in cheese and meats. You do not need to cut all fat from your diet. But you can make healthier choices about the types and amount of fat you eat. Even though it is a good idea to choose healthier fats, it is still important to be careful of how much fat you eat, because all fats are high in calories. What are the different types of fats? Unhealthy fat  · Saturated fat. Saturated fats are mostly in animal foods, such as meat and dairy foods. Tropical oils, such as coconut oil, palm oil, and cocoa butter, are also saturated fats. Healthy fats  · Monounsaturated fat. Monounsaturated fats are liquid at room temperature but get solid when refrigerated. Eating foods that are high in this fat may help lower your \"bad\" (LDL) cholesterol, keep your \"good\" (HDL) cholesterol level up, and lower your chances of getting coronary artery disease. This fat is found in canola oil, olive oil, peanut oil, olives, avocados, nuts, and nut butters. · Polyunsaturated fat. Polyunsaturated fats are liquid at room temperature. They are in safflower, sunflower, and corn oils. They are also the main fat in seafood. Omega-3 fatty acids are types of polyunsaturated fat. Eating fish may lower your chances of getting coronary artery disease. Fatty fish such as salmon and mackerel contain these healthy fatty acids. So do ground flaxseeds and flaxseed oil, soybeans, walnuts, and seeds. Why cut down on unhealthy fats? Eating foods that contain saturated fats can raise the LDL (\"bad\") cholesterol in your blood. Having a high level of LDL cholesterol increases your chance of hardening of the arteries (atherosclerosis), which can lead to heart disease, heart attack, and stroke. In general:  · No more than 10% of your daily calories should come from saturated fat. This is about 20 grams in a 2,000-calorie diet. · No more than 10% of your daily calories should come from polyunsaturated fat. This is about 20 grams in a 2,000-calorie diet. · Monounsaturated fats can be up to 15% of your daily calories. This is about 25 to 30 grams in a 2,000-calorie diet. If you're not sure how much fat you should be eating or how many calories you need each day to stay at a healthy weight, talk to a registered dietitian. A dietitian can help you create a plan that's right for you. What can you do to cut down on fat?   Foods like cheese, butter, sausage, and desserts can have a lot of unhealthy fats. Try these tips for healthier meals at home and when you eat out. At home  · Fill up on fruits, vegetables, and whole grains. · Think of meat as a side dish instead of as the main part of your meal.  · When you do eat meat, make it extra-lean ground beef (97% lean), ground turkey breast (without skin added), meats with fat trimmed off before cooking, or skinless chicken. · Try main dishes that use whole wheat pasta, brown rice, dried beans, or vegetables. · Use cooking methods that use little or no fat, such as broiling, steaming, or grilling. Use cooking spray instead of oil. If you use oil, use a monounsaturated oil, such as canola or olive oil. · Read food labels on canned, bottled, or packaged foods. Choose those with little saturated fat. When eating out at a restaurant  · Order foods that are broiled or poached instead of fried or breaded. · Cut back on the amount of butter or margarine that you use on bread. Use small amounts of olive oil instead. · Order sauces, gravies, and salad dressings on the side, and use only a little. · When you order pasta, choose tomato sauce instead of cream sauce. · Ask for salsa with your baked potato instead of sour cream, butter, cheese, or baugh. Where can you learn more? Go to http://www.gray.com/  Enter [id number] in the search box to learn more about \"Learning About Low-Fat Eating. \"  Current as of: December 17, 2020               Content Version: 13.0  © 4449-0724 [employer]. Care instructions adapted under license by [employer] (which disclaims liability or warranty for this information). If you have questions about a medical condition or this instruction, always ask your healthcare professional. [place] [address] any warranty or liability for your use of this information.          A Healthy Lifestyle: Care Instructions  Your Care Instructions     A healthy lifestyle can help you feel good, stay at a healthy weight, and have plenty of energy for both work and play. A healthy lifestyle is something you can share with your whole family. A healthy lifestyle also can lower your risk for serious health problems, such as high blood pressure, heart disease, and diabetes. You can follow a few steps listed below to improve your health and the health of your family. Follow-up care is a key part of your treatment and safety. Be sure to make and go to all appointments, and call your doctor if you are having problems. It's also a good idea to know your test results and keep a list of the medicines you take. How can you care for yourself at home? · Do not eat too much sugar, fat, or fast foods. You can still have dessert and treats now and then. The goal is moderation. · Start small to improve your eating habits. Pay attention to portion sizes, drink less juice and soda pop, and eat more fruits and vegetables. ? Eat a healthy amount of food. A 3-ounce serving of meat, for example, is about the size of a deck of cards. Fill the rest of your plate with vegetables and whole grains. ? Limit the amount of soda and sports drinks you have every day. Drink more water when you are thirsty. ? Eat plenty of fruits and vegetables every day. Have an apple or some carrot sticks as an afternoon snack instead of a candy bar. Try to have fruits and/or vegetables at every meal.  · Make exercise part of your daily routine. You may want to start with simple activities, such as walking, bicycling, or slow swimming. Try to be active 30 to 60 minutes every day. You do not need to do all 30 to 60 minutes all at once. For example, you can exercise 3 times a day for 10 or 20 minutes. Moderate exercise is safe for most people, but it is always a good idea to talk to your doctor before starting an exercise program.  · Keep moving.  [place] Longest the lawn, work in the garden, or clean your house. Take the stairs instead of the elevator at work. · If you smoke, quit. People who smoke have an increased risk for heart attack, stroke, cancer, and other lung illnesses. Quitting is hard, but there are ways to boost your chance of quitting tobacco for good. ? Use nicotine gum, patches, or lozenges. ? Ask your doctor about stop-smoking programs and medicines. ? Keep trying. In addition to reducing your risk of diseases in the future, you will notice some benefits soon after you stop using tobacco. If you have shortness of breath or asthma symptoms, they will likely get better within a few weeks after you quit. · Limit how much alcohol you drink. Moderate amounts of alcohol (up to 2 drinks a day for men, 1 drink a day for women) are okay. But drinking too much can lead to liver problems, high blood pressure, and other health problems. Family health  If you have a family, there are many things you can do together to improve your health. · Eat meals together as a family as often as possible. · Eat healthy foods. This includes fruits, vegetables, lean meats and dairy, and whole grains. · Include your family in your fitness plan. Most people think of activities such as jogging or tennis as the way to fitness, but there are many ways you and your family can be more active. Anything that makes you breathe hard and gets your heart pumping is exercise. Here are some tips:  ? Walk to do errands or to take your child to school or the bus.  ? Go for a family bike ride after dinner instead of watching TV. Where can you learn more? Go to http://www.gray.com/  Enter G073 in the search box to learn more about \"A Healthy Lifestyle: Care Instructions. \"  Current as of: June 16, 2021               Content Version: 13.0  © 1929-9329 [employer].    Care instructions adapted under license by [employer] (which disclaims liability or warranty for this information). If you have questions about a medical condition or this instruction, always ask your healthcare professional. [place] [address] any warranty or liability for your use of this information.

## 2021-10-27 RX ORDER — TRIAMCINOLONE ACETONIDE 1 MG/G
OINTMENT TOPICAL
Qty: 2270 G | Refills: 0 | Status: SHIPPED | OUTPATIENT
Start: 2021-10-27 | End: 2022-09-13

## 2022-03-17 ENCOUNTER — HOSPITAL ENCOUNTER (OUTPATIENT)
Dept: LAB | Age: 52
Discharge: HOME | End: 2022-03-17

## 2022-03-17 LAB — XX-LABCORP SPECIMEN COL,LCBCF: NORMAL

## 2022-03-17 PROCEDURE — 99001 SPECIMEN HANDLING PT-LAB: CPT

## 2022-03-18 PROBLEM — E66.01 MORBID OBESITY WITH BMI OF 40.0-44.9, ADULT (HCC): Status: ACTIVE | Noted: 2018-07-31

## 2022-03-18 PROBLEM — Z91.09 ENVIRONMENTAL ALLERGIES: Status: ACTIVE | Noted: 2018-07-31

## 2022-03-18 PROBLEM — R92.8 ABNORMAL MAMMOGRAM: Status: ACTIVE | Noted: 2020-10-05

## 2022-03-18 LAB
ALBUMIN SERPL-MCNC: 4 G/DL (ref 3.8–4.9)
ALBUMIN/GLOB SERPL: 1.3 {RATIO} (ref 1.2–2.2)
ALP SERPL-CCNC: 65 IU/L (ref 44–121)
ALT SERPL-CCNC: 19 IU/L (ref 0–32)
AST SERPL-CCNC: 18 IU/L (ref 0–40)
BILIRUB SERPL-MCNC: 0.2 MG/DL (ref 0–1.2)
BUN SERPL-MCNC: 12 MG/DL (ref 6–24)
BUN/CREAT SERPL: 16 (ref 9–23)
CALCIUM SERPL-MCNC: 8.5 MG/DL (ref 8.7–10.2)
CHLORIDE SERPL-SCNC: 105 MMOL/L (ref 96–106)
CHOLEST SERPL-MCNC: 206 MG/DL (ref 100–199)
CO2 SERPL-SCNC: 21 MMOL/L (ref 20–29)
CREAT SERPL-MCNC: 0.75 MG/DL (ref 0.57–1)
EGFR: 96 ML/MIN/1.73
ERYTHROCYTE [DISTWIDTH] IN BLOOD BY AUTOMATED COUNT: 13.4 % (ref 11.7–15.4)
EST. AVERAGE GLUCOSE BLD GHB EST-MCNC: 111 MG/DL
GLOBULIN SER CALC-MCNC: 3 G/DL (ref 1.5–4.5)
GLUCOSE SERPL-MCNC: 96 MG/DL (ref 65–99)
HBA1C MFR BLD: 5.5 % (ref 4.8–5.6)
HCT VFR BLD AUTO: 36.5 % (ref 34–46.6)
HDLC SERPL-MCNC: 56 MG/DL
HGB BLD-MCNC: 11.7 G/DL (ref 11.1–15.9)
IMP & REVIEW OF LAB RESULTS: NORMAL
INTERPRETATION: NORMAL
LDLC SERPL CALC-MCNC: 136 MG/DL (ref 0–99)
MCH RBC QN AUTO: 27.3 PG (ref 26.6–33)
MCHC RBC AUTO-ENTMCNC: 32.1 G/DL (ref 31.5–35.7)
MCV RBC AUTO: 85 FL (ref 79–97)
PLATELET # BLD AUTO: 217 X10E3/UL (ref 150–450)
POTASSIUM SERPL-SCNC: 3.9 MMOL/L (ref 3.5–5.2)
PROT SERPL-MCNC: 7 G/DL (ref 6–8.5)
RBC # BLD AUTO: 4.28 X10E6/UL (ref 3.77–5.28)
SODIUM SERPL-SCNC: 142 MMOL/L (ref 134–144)
TRIGL SERPL-MCNC: 77 MG/DL (ref 0–149)
VLDLC SERPL CALC-MCNC: 14 MG/DL (ref 5–40)
WBC # BLD AUTO: 5.6 X10E3/UL (ref 3.4–10.8)

## 2022-03-19 PROBLEM — I10 ESSENTIAL HYPERTENSION: Status: ACTIVE | Noted: 2019-08-29

## 2022-03-19 PROBLEM — R73.01 IMPAIRED FASTING GLUCOSE: Status: ACTIVE | Noted: 2019-08-29

## 2022-03-19 PROBLEM — R60.0 LOWER EXTREMITY EDEMA: Status: ACTIVE | Noted: 2019-08-29

## 2022-03-19 PROBLEM — L30.9 ECZEMA: Status: ACTIVE | Noted: 2018-07-31

## 2022-03-21 NOTE — PROGRESS NOTES
Improvement in lipid panel. Also diabetes test is in normal range. For now continue life style , diet modification and exercise. Attempt to loose weight. Further discussion on follow up visit.

## 2022-03-22 DIAGNOSIS — I10 ESSENTIAL HYPERTENSION: ICD-10-CM

## 2022-03-22 DIAGNOSIS — R60.0 LOWER EXTREMITY EDEMA: ICD-10-CM

## 2022-03-22 NOTE — TELEPHONE ENCOUNTER
Last OV: 10/22/2021  Last labs: 3/17/2022  Next OV: 4/27/2022         Message  Received: Today  [place] [facility] Nurse Pool  Subject: Refill Request     QUESTIONS   Name of Medication? hydroCHLOROthiazide (HYDRODIURIL) 25 mg tablet   Patient-reported dosage and instructions? one once a day   How many days do you have left? 9   Preferred Pharmacy? [id number]   Pharmacy phone number (if available)? [phone]   ---------------------------------------------------------------------------   --------------   CALL BACK INFO   What is the best way for the office to contact you? OK to leave message on   voicemail   Preferred Call Back Phone Number?  [phone]

## 2022-03-23 RX ORDER — HYDROCHLOROTHIAZIDE 25 MG/1
25 TABLET ORAL DAILY
Qty: 90 TABLET | Refills: 1 | Status: SHIPPED | OUTPATIENT
Start: 2022-03-23 | End: 2022-09-13 | Stop reason: SDUPTHER

## 2022-04-05 ENCOUNTER — HOSPITAL ENCOUNTER (OUTPATIENT)
Dept: ULTRASOUND IMAGING | Age: 52
Discharge: HOME | End: 2022-04-05
Attending: FAMILY MEDICINE
Payer: COMMERCIAL

## 2022-04-05 ENCOUNTER — HOSPITAL ENCOUNTER (OUTPATIENT)
Dept: MAMMOGRAPHY | Age: 52
Discharge: HOME | End: 2022-04-05
Attending: FAMILY MEDICINE
Payer: COMMERCIAL

## 2022-04-05 DIAGNOSIS — R92.8 ABNORMAL MAMMOGRAM: ICD-10-CM

## 2022-04-05 PROCEDURE — 77062 BREAST TOMOSYNTHESIS BI: CPT

## 2022-04-11 NOTE — PROGRESS NOTES
Contacted patient and verified identity using name and date of birth (2- identifiers)  Spoke with patient and she verbalized understanding of Improvement in lipid panel. Also diabetes test is in normal range. For now continue life style , diet modification and exercise. Attempt to loose weight.  Further discussion on follow up visit

## 2022-04-27 ENCOUNTER — HOSPITAL ENCOUNTER (OUTPATIENT)
Dept: LAB | Age: 52
Discharge: HOME | End: 2022-04-27

## 2022-04-27 ENCOUNTER — OFFICE VISIT (OUTPATIENT)
Dept: FAMILY MEDICINE CLINIC | Age: 52
End: 2022-04-27
Payer: COMMERCIAL

## 2022-04-27 VITALS
RESPIRATION RATE: 16 BRPM | WEIGHT: 266.4 LBS | HEIGHT: 62 IN | HEART RATE: 75 BPM | TEMPERATURE: 97.6 F | OXYGEN SATURATION: 98 % | BODY MASS INDEX: 49.02 KG/M2 | SYSTOLIC BLOOD PRESSURE: 126 MMHG | DIASTOLIC BLOOD PRESSURE: 76 MMHG

## 2022-04-27 DIAGNOSIS — R60.0 LOWER EXTREMITY EDEMA: ICD-10-CM

## 2022-04-27 DIAGNOSIS — R35.0 URINE FREQUENCY: ICD-10-CM

## 2022-04-27 DIAGNOSIS — Z12.11 SCREENING FOR COLON CANCER: ICD-10-CM

## 2022-04-27 DIAGNOSIS — I10 ESSENTIAL HYPERTENSION: ICD-10-CM

## 2022-04-27 DIAGNOSIS — Z91.09 ENVIRONMENTAL ALLERGIES: ICD-10-CM

## 2022-04-27 DIAGNOSIS — B35.1 ONYCHOMYCOSIS: ICD-10-CM

## 2022-04-27 DIAGNOSIS — R73.01 IMPAIRED FASTING GLUCOSE: Primary | ICD-10-CM

## 2022-04-27 DIAGNOSIS — R21 RASH: ICD-10-CM

## 2022-04-27 DIAGNOSIS — L30.9 ECZEMA, UNSPECIFIED TYPE: ICD-10-CM

## 2022-04-27 DIAGNOSIS — M72.2 PLANTAR FASCIITIS, LEFT: ICD-10-CM

## 2022-04-27 DIAGNOSIS — Z11.3 SCREENING FOR STD (SEXUALLY TRANSMITTED DISEASE): ICD-10-CM

## 2022-04-27 DIAGNOSIS — M25.572 LEFT ANKLE PAIN, UNSPECIFIED CHRONICITY: ICD-10-CM

## 2022-04-27 DIAGNOSIS — E66.01 MORBID OBESITY WITH BMI OF 40.0-44.9, ADULT (HCC): ICD-10-CM

## 2022-04-27 DIAGNOSIS — M79.672 LEFT FOOT PAIN: ICD-10-CM

## 2022-04-27 DIAGNOSIS — R92.8 ABNORMAL MAMMOGRAM: ICD-10-CM

## 2022-04-27 LAB — XX-LABCORP SPECIMEN COL,LCBCF: NORMAL

## 2022-04-27 PROCEDURE — 99214 OFFICE O/P EST MOD 30 MIN: CPT | Performed by: FAMILY MEDICINE

## 2022-04-27 PROCEDURE — 99001 SPECIMEN HANDLING PT-LAB: CPT

## 2022-04-27 RX ORDER — MELOXICAM 15 MG/1
15 TABLET ORAL DAILY
Qty: 30 TABLET | Refills: 0 | Status: SHIPPED | OUTPATIENT
Start: 2022-04-27 | End: 2022-09-13

## 2022-04-27 RX ORDER — PRENATAL VIT 91/IRON/FOLIC/DHA 28-975-200
COMBINATION PACKAGE (EA) ORAL 2 TIMES DAILY
Qty: 30 G | Refills: 0 | Status: SHIPPED | OUTPATIENT
Start: 2022-04-27 | End: 2022-09-13 | Stop reason: SDUPTHER

## 2022-04-27 RX ORDER — BACITRACIN ZINC 500 UNIT/G
OINTMENT (GRAM) TOPICAL 2 TIMES DAILY
Qty: 15 G | Refills: 0 | Status: SHIPPED | OUTPATIENT
Start: 2022-04-27

## 2022-04-27 RX ORDER — IBUPROFEN 800 MG/1
800 TABLET ORAL
Qty: 90 TABLET | Refills: 0 | Status: CANCELLED | OUTPATIENT
Start: 2022-04-27

## 2022-04-27 RX ORDER — MELOXICAM 15 MG/1
15 TABLET ORAL DAILY
Qty: 30 TABLET | Refills: 0 | Status: CANCELLED | OUTPATIENT
Start: 2022-04-27

## 2022-04-27 NOTE — PROGRESS NOTES
HISTORY OF PRESENT ILLNESS  Ronnie Lovett is a 46 y.o. female. HPI: Here for routine follow up. H/o prediabetes. Discussed high BMI. Discussed importance of weight and diet modification. Discussed importance of weight gain. She will work on it with more compliance. Done referral to medical weight  Loss program.  Hypertension. Vitals stable. Asymptomatic. Taking medication with compliance. C.o multiple site pain. Left knee, left ankle and left sole of foot. Discontinue ibuprofen and given Mobic to take as needed. Advised to take Tylenol arthritis as needed for pain. Noted candida infection around foot arch bilaterally. Onychomycosis. Also blisters over left sole of the foot. Open sore. Mild discomfort on sores. No discharge. Has seen Dr. Ramandeep Dougherty for left knee and leg pain. For now no leg swelling. No other signs of volume over load. Denies any headache, dizziness, no chest pain or trouble breathing, no arm or leg weakness. No nausea or vomiting, no weight or appetite changes, no mood changes . No urine or bowel complains, no palpitation, no diaphoresis. No abdominal pain. No cold or cough. No leg swelling. No fever. No sleep trouble. Visit Vitals  /76 (BP 1 Location: Left upper arm, BP Patient Position: Sitting, BP Cuff Size: Adult)   Pulse 75   Temp 97.6 °F (36.4 °C) (Temporal)   Resp 16   Ht 5' 2\" (1.575 m)   Wt 266 lb 6.4 oz (120.8 kg)   SpO2 98%   BMI 48.73 kg/m²     Review medication list, vitals, problem list,allergies.    Lab Results   Component Value Date/Time    WBC 5.6 03/17/2022 12:00 AM    HGB 11.7 03/17/2022 12:00 AM    HCT 36.5 03/17/2022 12:00 AM    PLATELET 587 85/64/3295 12:00 AM    MCV 85 03/17/2022 12:00 AM     Lab Results   Component Value Date/Time    Sodium 142 03/17/2022 12:00 AM    Potassium 3.9 03/17/2022 12:00 AM    Chloride 105 03/17/2022 12:00 AM    CO2 21 03/17/2022 12:00 AM    Anion gap 5 10/12/2020 09:30 AM    Glucose 96 03/17/2022 12:00 AM    BUN 12 03/17/2022 12:00 AM    Creatinine 0.75 03/17/2022 12:00 AM    BUN/Creatinine ratio 16 03/17/2022 12:00 AM    GFR est AA >60 10/12/2020 09:30 AM    GFR est non-AA >60 10/12/2020 09:30 AM    Calcium 8.5 (L) 03/17/2022 12:00 AM    Bilirubin, total 0.2 03/17/2022 12:00 AM    Alk. phosphatase 65 03/17/2022 12:00 AM    Protein, total 7.0 03/17/2022 12:00 AM    Albumin 4.0 03/17/2022 12:00 AM    Globulin 3.7 10/12/2020 09:30 AM    A-G Ratio 1.3 03/17/2022 12:00 AM    ALT (SGPT) 19 03/17/2022 12:00 AM    AST (SGOT) 18 03/17/2022 12:00 AM     Lab Results   Component Value Date/Time    Cholesterol, total 206 (H) 03/17/2022 12:00 AM    HDL Cholesterol 56 03/17/2022 12:00 AM    LDL, calculated 136 (H) 03/17/2022 12:00 AM    LDL, calculated 160.4 (H) 10/12/2020 09:30 AM    VLDL, calculated 14 03/17/2022 12:00 AM    VLDL, calculated 21.6 10/12/2020 09:30 AM    Triglyceride 77 03/17/2022 12:00 AM    CHOL/HDL Ratio 4.1 10/12/2020 09:30 AM     Lab Results   Component Value Date/Time    TSH 1.90 07/31/2018 10:11 AM     Lab Results   Component Value Date/Time    Hemoglobin A1c 5.5 03/17/2022 12:00 AM     ROS: see HPI     Physical Exam  Constitutional:       General: She is not in acute distress. Cardiovascular:      Rate and Rhythm: Normal rate and regular rhythm. Heart sounds: Normal heart sounds. Abdominal:      General: Bowel sounds are normal.      Palpations: Abdomen is soft. Tenderness: There is no abdominal tenderness. Musculoskeletal:         General: No swelling. Cervical back: Neck supple. Comments: Both feet:  Around foot arch whitish spot. Fungal infection. No open sore or any tenderness or itching. Left sole of foot: open sore. Different stage of healing. No discharge. Neurological:      Mental Status: She is oriented to person, place, and time.    Psychiatric:         Behavior: Behavior normal.         ASSESSMENT and PLAN  Diagnoses and all orders for this visit:    Impaired fasting glucose: Discussed lifestyle and diet modification    Plantar fasciitis, left: Currently open sore over the left sole of the foot from blister. Advised to take bacitracin  -     meloxicam (Mobic) 15 mg tablet; Take 1 Tablet by mouth daily. , Normal, Disp-30 Tablet, R-0    Left foot pain: See above  -     meloxicam (Mobic) 15 mg tablet; Take 1 Tablet by mouth daily. , Normal, Disp-30 Tablet, R-0    Abnormal mammogram: Recent mammogram showed no abnormality    Essential hypertension:well controlled. Continue current dose of medication and low salt diet. Exercise as tolerated. Morbid obesity with BMI of 40.0-44.9, adult [facility]): Discussed importance of weight loss. That might help with joint pain. Discussed diet modification and exercise. She will work on it. Also agreed to see the medical weight loss program.  -     REFERRAL TO WEIGHT LOSS    Lower extremity edema: No pitting edema at this time:    Environmental allergies: Fairly stable    Eczema, unspecified type: No rash    Left ankle pain, unspecified chronicity: Chronic in nature. Giving trouble walking. Advised to make a follow-up appointment with Ortho    Rash: Over the left sole of the foot. Given bacitracin  -     bacitracin zinc (BACITRACIN) ointment; Apply  to affected area two (2) times a day., Normal, Disp-15 g, R-0  -     REFERRAL TO PODIATRY    Onychomycosis: Sending to podiatry  -     terbinafine HCL (LAMISIL) 1 % topical cream; Apply  to affected area two (2) times a day., Normal, Disp-30 g, R-0  -     REFERRAL TO PODIATRY    Urine frequency: Last A1c within normal limits. No vomiting. Mild urgency. Checking UA  -     URINALYSIS W/ RFLX MICROSCOPIC; Future    Screening for STD (sexually transmitted disease): No exposure  -     HIV 1/2 AG/AB, 4TH GENERATION,W RFLX CONFIRM; Future  -     RPR; Future  -     CHLAMYDIA/NEISSERIA AMPLIFICATION;  Future  -     HEP B SURFACE AG; Future    Screening for colon cancer  -     REFERRAL TO GASTROENTEROLOGY      Pt understood and agree with the plan   Review HM  Follow up in 3 months. Please note that this dictation was completed with [employer], the computer voice recognition software. Quite often unanticipated grammatical, syntax, homophones, and other interpretive errors are inadvertently transcribed by the computer software. Please disregard these errors. Please excuse any errors that have escaped final proofreading.

## 2022-04-27 NOTE — PROGRESS NOTES
1. Have you been to the ER, urgent care clinic since your last visit? Hospitalized since your last visit? No    2. Have you seen or consulted any other health care providers outside of the [address] [place] [address] since your last visit? Include any pap smears or colon screening.  No    Chief Complaint   Patient presents with    Hypertension    Blood sugar problem    Cholesterol Problem    Anemia    Plantar Fasciitis    Other     abnormal mammogram    Ankle Pain     left ankle pain    Medication Evaluation     wants Rx for Triamcinolone cream

## 2022-04-30 LAB
APPEARANCE UR: CLEAR
BILIRUB UR QL STRIP: NEGATIVE
C TRACH RRNA SPEC QL NAA+PROBE: NEGATIVE
COLOR UR: YELLOW
GLUCOSE UR QL STRIP: NEGATIVE
HBV SURFACE AG SERPL QL IA: NEGATIVE
HGB UR QL STRIP: NEGATIVE
HIV 1+2 AB+HIV1 P24 AG SERPL QL IA: NON REACTIVE
KETONES UR QL STRIP: NEGATIVE
LEUKOCYTE ESTERASE UR QL STRIP: NEGATIVE
MICRO URNS: NORMAL
N GONORRHOEA RRNA SPEC QL NAA+PROBE: NEGATIVE
NITRITE UR QL STRIP: NEGATIVE
PH UR STRIP: 6 [PH] (ref 5–7.5)
PROT UR QL STRIP: NEGATIVE
RPR SER QL: NON REACTIVE
SP GR UR STRIP: 1.02 (ref 1–1.03)
UROBILINOGEN UR STRIP-MCNC: 0.2 MG/DL (ref 0.2–1)

## 2022-05-14 NOTE — PROGRESS NOTES
[phone] ([employer]) left voicemail for Mirian Nicholas advising that all lab testing was normal, my chart message also sent on May 14, 2022 @ 9:24 am

## 2022-09-13 ENCOUNTER — OFFICE VISIT (OUTPATIENT)
Dept: FAMILY MEDICINE CLINIC | Age: 52
End: 2022-09-13
Payer: COMMERCIAL

## 2022-09-13 VITALS
DIASTOLIC BLOOD PRESSURE: 66 MMHG | WEIGHT: 268.6 LBS | HEART RATE: 87 BPM | RESPIRATION RATE: 16 BRPM | BODY MASS INDEX: 49.43 KG/M2 | TEMPERATURE: 97.7 F | HEIGHT: 62 IN | OXYGEN SATURATION: 95 % | SYSTOLIC BLOOD PRESSURE: 110 MMHG

## 2022-09-13 DIAGNOSIS — I10 ESSENTIAL HYPERTENSION: Primary | ICD-10-CM

## 2022-09-13 DIAGNOSIS — R60.0 LOWER EXTREMITY EDEMA: ICD-10-CM

## 2022-09-13 DIAGNOSIS — E78.5 DYSLIPIDEMIA: ICD-10-CM

## 2022-09-13 DIAGNOSIS — B35.1 ONYCHOMYCOSIS: ICD-10-CM

## 2022-09-13 PROCEDURE — 99213 OFFICE O/P EST LOW 20 MIN: CPT | Performed by: FAMILY MEDICINE

## 2022-09-13 RX ORDER — PHENTERMINE HYDROCHLORIDE 37.5 MG/1
37.5 TABLET ORAL
Qty: 30 TABLET | Refills: 0 | Status: SHIPPED | OUTPATIENT
Start: 2022-09-13

## 2022-09-13 RX ORDER — TERBINAFINE HYDROCHLORIDE 250 MG/1
TABLET ORAL
COMMUNITY
Start: 2022-06-16

## 2022-09-13 RX ORDER — PRENATAL VIT 91/IRON/FOLIC/DHA 28-975-200
COMBINATION PACKAGE (EA) ORAL 2 TIMES DAILY
Qty: 30 G | Refills: 0 | Status: SHIPPED | OUTPATIENT
Start: 2022-09-13

## 2022-09-13 RX ORDER — HYDROCHLOROTHIAZIDE 25 MG/1
25 TABLET ORAL DAILY
Qty: 90 TABLET | Refills: 1 | Status: SHIPPED | OUTPATIENT
Start: 2022-09-13

## 2022-09-13 NOTE — PROGRESS NOTES
1. \"Have you been to the ER, urgent care clinic since your last visit? Hospitalized since your last visit? \" No    2. \"Have you seen or consulted any other health care providers outside of the [address] [place] [address] [place] since your last visit? \" Yes When: [address]: 6/16/22      Chief Complaint   Patient presents with    Blood sugar problem    Hypertension

## 2022-09-13 NOTE — PROGRESS NOTES
HISTORY OF PRESENT ILLNESS  Reyna Xiao is a 46 y.o. female. HPI: Here for follow-up. Hypertension. Vitals been stable. No recurrent syncope. Taking medication compliance and no side effects. Currently no pitting edema. No calf tenderness. Dyslipidemia. Working on diet modification. Discussed high BMI. She is trying to work but needs little help. Insurance not covering medical weight loss program.  Discussed importance of calorie count, portion control and exercise. She is working and trying to be more compliant. Onychomycosis. Given medication refill. Denies any headache, dizziness, no chest pain or trouble breathing, no arm or leg weakness. No nausea or vomiting, no weight or appetite changes, no mood changes . No urine or bowel complains, no palpitation, no diaphoresis. No abdominal pain. No cold or cough. No leg swelling. No fever. No sleep trouble. Visit Vitals  /66 (BP 1 Location: Left upper arm, BP Patient Position: Sitting, BP Cuff Size: Adult)   Pulse 87   Temp 97.7 °F (36.5 °C) (Temporal)   Resp 16   Ht 5' 2\" (1.575 m)   Wt 268 lb 9.6 oz (121.8 kg)   SpO2 95%   BMI 49.13 kg/m²     Review medication list, vitals, problem list,allergies. Lab Results   Component Value Date/Time    WBC 5.6 03/17/2022 12:00 AM    HGB 11.7 03/17/2022 12:00 AM    HCT 36.5 03/17/2022 12:00 AM    PLATELET 931 54/41/4929 12:00 AM    MCV 85 03/17/2022 12:00 AM     Lab Results   Component Value Date/Time    Sodium 142 03/17/2022 12:00 AM    Potassium 3.9 03/17/2022 12:00 AM    Chloride 105 03/17/2022 12:00 AM    CO2 21 03/17/2022 12:00 AM    Anion gap 5 10/12/2020 09:30 AM    Glucose 96 03/17/2022 12:00 AM    BUN 12 03/17/2022 12:00 AM    Creatinine 0.75 03/17/2022 12:00 AM    BUN/Creatinine ratio 16 03/17/2022 12:00 AM    GFR est AA >60 10/12/2020 09:30 AM    GFR est non-AA >60 10/12/2020 09:30 AM    Calcium 8.5 (L) 03/17/2022 12:00 AM    Bilirubin, total 0.2 03/17/2022 12:00 AM    Alk.  phosphatase 65 03/17/2022 12:00 AM    Protein, total 7.0 03/17/2022 12:00 AM    Albumin 4.0 03/17/2022 12:00 AM    Globulin 3.7 10/12/2020 09:30 AM    A-G Ratio 1.3 03/17/2022 12:00 AM    ALT (SGPT) 19 03/17/2022 12:00 AM    AST (SGOT) 18 03/17/2022 12:00 AM     Lab Results   Component Value Date/Time    Cholesterol, total 206 (H) 03/17/2022 12:00 AM    HDL Cholesterol 56 03/17/2022 12:00 AM    LDL, calculated 136 (H) 03/17/2022 12:00 AM    LDL, calculated 160.4 (H) 10/12/2020 09:30 AM    VLDL, calculated 14 03/17/2022 12:00 AM    VLDL, calculated 21.6 10/12/2020 09:30 AM    Triglyceride 77 03/17/2022 12:00 AM    CHOL/HDL Ratio 4.1 10/12/2020 09:30 AM     Lab Results   Component Value Date/Time    TSH 1.90 07/31/2018 10:11 AM     Lab Results   Component Value Date/Time    Hemoglobin A1c 5.5 03/17/2022 12:00 AM           ROS: see HPI     Physical Exam  Constitutional:       General: She is not in acute distress. Cardiovascular:      Rate and Rhythm: Normal rate and regular rhythm. Heart sounds: Normal heart sounds. Abdominal:      General: Bowel sounds are normal.      Palpations: Abdomen is soft. Tenderness: There is no abdominal tenderness. Musculoskeletal:         General: No swelling. Cervical back: Neck supple. Neurological:      Mental Status: She is oriented to person, place, and time. Psychiatric:         Behavior: Behavior normal.       ASSESSMENT and PLAN    ICD-10-CM ICD-9-CM    1. Essential hypertension : well controlled. Continue current dose of medication and low salt diet. Exercise as tolerated. I10 401.9 hydroCHLOROthiazide (HYDRODIURIL) 25 mg tablet      2. Lower extremity edema : no pitting edema. Continue low salt diet.  R60.0 782.3 hydroCHLOROthiazide (HYDRODIURIL) 25 mg tablet      3. Onychomycosis : continue symptomatic treatment. B35.1 110.1 terbinafine HCL (LAMISIL) 1 % topical cream      4. Dyslipidemia : working on life style modification. E78.5 272.4       5.  BMI 45.0-49.9, adult [facility]) : discussed life style and diet modification. Discussed portion control. Choice of diet and exercise. Insurance has declined medical weight loss program coverage. Given phentermine. Discussed side effects and trial for a month or two max. She understood . Follow up next visit. Z68.42 V85.42 phentermine (ADIPEX-P) 37.5 mg tablet    not able to afford weight loss program.       Pt understood and agree with the plan   Review HM   Provided contact info for GI to get an appt for colonoscopy consultation. Due immunization from the pharmacy. Follow-up and Dispositions    Return in about 1 month (around 10/13/2022). Please note that this dictation was completed with [employer], the computer voice recognition software. Quite often unanticipated grammatical, syntax, homophones, and other interpretive errors are inadvertently transcribed by the computer software. Please disregard these errors. Please excuse any errors that have escaped final proofreading.

## 2022-10-14 NOTE — TELEPHONE ENCOUNTER
[facility] Nurse Pool  Subject: Refill Request     QUESTIONS   Name of Medication? phentermine (ADIPEX-P) 37.5 mg tablet   Patient-reported dosage and instructions? once daily   How many days do you have left? 0   Preferred Pharmacy? [id number]   Pharmacy phone number (if available)? [phone]   Additional Information for Provider? Has appt on 10/18, pt thought it was   the 13th   ---------------------------------------------------------------------------   --------------   CALL BACK INFO   What is the best way for the office to contact you? OK to leave message on   voicemail   Preferred Call Back Phone Number? [phone]   ---------------------------------------------------------------------------   --------------   SCRIPT ANSWERS   Relationship to Patient?  Self

## 2022-10-19 RX ORDER — PHENTERMINE HYDROCHLORIDE 37.5 MG/1
37.5 TABLET ORAL
Qty: 30 TABLET | Refills: 0 | OUTPATIENT
Start: 2022-10-19

## 2022-11-02 NOTE — ED NOTES
Right temporal epidural hematoma  · S/p fall 10-20 ft from tree stand  · Multiple facial fractures including temporal bone  · On exam, GCS 15  Complaining of HA  Imaging:  · CT head wo, 11/1/2022: new focus of extra axial hemorrhage adjacent to the previously seen hemorrhage in the right anterior temporal lobe  Previously seen hemorrhage is stable  Small foci of extra-axial hemorrhage of the right frontal convexity stable compared to prior  Plan:  · Continue to monitor neuro exam closely  · Q1h neuro checks  · STAT CT head with decline in GCS > 2 pts in 1 hour  · OMFS- non operative facial fractures  Unasyn while inpatient with transition to augmentin when discharged for 7 days  OP f/u   · Ophthalmology- R orbital fracture with outpatient follow up after discharge  · ENT- No EAC complication or involvement  No ototopic drops needed  Outpatient follow up if symptoms persistent  · Keppra per trauma team   · Plan for repeat Ct head this morning to establish stability of epidural hematomas   · New hemorrhage on CT head was present prior, but small with less intensity on CT imaging  · Mobilize with PT/OT  · DVT ppx: SCDs, Hold pharmacologic DVT ppx  Neurosurgery will continue to follow  Please call with questions or concerns  Waleska Morales Supervisor at [facility], contacted regarding [address] testing. Infection control contacted and on speaker phone conversation and all parties made aware of patient and situation.

## 2023-01-10 ENCOUNTER — TELEPHONE (OUTPATIENT)
Dept: FAMILY MEDICINE CLINIC | Age: 53
End: 2023-01-10

## 2023-01-10 NOTE — TELEPHONE ENCOUNTER
Pt states that she tested positive twice at her work for [employer]. She states that she started feeling bad last night and she would like to know if Dr Mega Suh would call in the [address] to the [address] [place] [address]. Please advise. Thank you.

## 2023-01-10 NOTE — TELEPHONE ENCOUNTER
Patient c/o body aches,runny nose, cough, and headache that hasn't subsided due to COVID.  She requests Paxlovid

## 2023-01-11 NOTE — TELEPHONE ENCOUNTER
Pt called this morning in reference to medication. Pt advised Paxlovid has been called in to her pharmacy. Closing encounter.

## 2023-02-15 ENCOUNTER — TELEPHONE (OUTPATIENT)
Age: 53
End: 2023-02-15

## 2023-02-15 NOTE — TELEPHONE ENCOUNTER
----- Message from Braulio Olvera sent at 2/15/2023 11:46 AM EST -----  Subject: Message to Provider    QUESTIONS  Information for Provider? Patient would like lab work for her April appt. Please call and advise.   ---------------------------------------------------------------------------  --------------  Yue Austining INFO  [phone]; OK to leave message on voicemail  ---------------------------------------------------------------------------  --------------  SCRIPT ANSWERS  Relationship to Patient?  Self

## 2023-03-01 ENCOUNTER — TELEPHONE (OUTPATIENT)
Age: 53
End: 2023-03-01

## 2023-03-01 NOTE — TELEPHONE ENCOUNTER
Pt called Friday wanting an appt and since there was not a Dr in the office she was told to go to an [facility]. She went to the [facility] on [address] and was DX with the Flu. Was told that she could return to work on 2/28/2023 . She called because she is still very sick with dry heaves and not feeling any better and was told to call her PCP if she was not any better by today. She thinks she needs to be seen again or needs an antibiotic. Please advise.

## 2023-03-03 ENCOUNTER — TELEPHONE (OUTPATIENT)
Age: 53
End: 2023-03-03

## 2023-03-03 NOTE — TELEPHONE ENCOUNTER
Tried reaching out to patient to triage before thv. There was was no answer and I wasn't able to leave a vm due to being full.       Lillie Joyce

## 2023-04-03 DIAGNOSIS — I10 ESSENTIAL (PRIMARY) HYPERTENSION: ICD-10-CM

## 2023-04-03 DIAGNOSIS — R60.0 LOCALIZED EDEMA: ICD-10-CM

## 2023-04-03 RX ORDER — HYDROCHLOROTHIAZIDE 25 MG/1
TABLET ORAL
Qty: 90 TABLET | Refills: 0 | Status: SHIPPED | OUTPATIENT
Start: 2023-04-03

## 2023-05-17 ENCOUNTER — OFFICE VISIT (OUTPATIENT)
Age: 53
End: 2023-05-17
Payer: COMMERCIAL

## 2023-05-17 VITALS
HEIGHT: 62 IN | TEMPERATURE: 97.3 F | RESPIRATION RATE: 18 BRPM | HEART RATE: 89 BPM | BODY MASS INDEX: 49.21 KG/M2 | OXYGEN SATURATION: 95 % | SYSTOLIC BLOOD PRESSURE: 128 MMHG | WEIGHT: 267.4 LBS | DIASTOLIC BLOOD PRESSURE: 83 MMHG

## 2023-05-17 DIAGNOSIS — M25.572 ACUTE LEFT ANKLE PAIN: ICD-10-CM

## 2023-05-17 DIAGNOSIS — Z13.228 SCREENING FOR METABOLIC DISORDER: Primary | ICD-10-CM

## 2023-05-17 DIAGNOSIS — Z12.31 ENCOUNTER FOR SCREENING MAMMOGRAM FOR MALIGNANT NEOPLASM OF BREAST: ICD-10-CM

## 2023-05-17 DIAGNOSIS — Z00.00 ANNUAL PHYSICAL EXAM: ICD-10-CM

## 2023-05-17 DIAGNOSIS — Z87.2 HISTORY OF ECZEMA: ICD-10-CM

## 2023-05-17 DIAGNOSIS — E66.01 CLASS 3 SEVERE OBESITY WITHOUT SERIOUS COMORBIDITY WITH BODY MASS INDEX (BMI) OF 45.0 TO 49.9 IN ADULT, UNSPECIFIED OBESITY TYPE (HCC): ICD-10-CM

## 2023-05-17 DIAGNOSIS — Z11.3 SCREENING FOR STD (SEXUALLY TRANSMITTED DISEASE): ICD-10-CM

## 2023-05-17 PROCEDURE — 99204 OFFICE O/P NEW MOD 45 MIN: CPT | Performed by: NURSE PRACTITIONER

## 2023-05-17 PROCEDURE — 3079F DIAST BP 80-89 MM HG: CPT | Performed by: NURSE PRACTITIONER

## 2023-05-17 PROCEDURE — 3074F SYST BP LT 130 MM HG: CPT | Performed by: NURSE PRACTITIONER

## 2023-05-17 RX ORDER — HYDROCORTISONE VALERATE CREAM 2 MG/G
CREAM TOPICAL
Qty: 1 EACH | Refills: 2 | Status: SHIPPED | OUTPATIENT
Start: 2023-05-17

## 2023-05-17 RX ORDER — PREDNISONE 20 MG/1
20 TABLET ORAL DAILY
Qty: 10 TABLET | Refills: 0 | Status: SHIPPED | OUTPATIENT
Start: 2023-05-17 | End: 2023-05-22

## 2023-05-17 RX ORDER — HYDROXYZINE HYDROCHLORIDE 25 MG/1
25 TABLET, FILM COATED ORAL EVERY 6 HOURS PRN
COMMUNITY
Start: 2016-11-21 | End: 2023-05-17 | Stop reason: ALTCHOICE

## 2023-05-17 RX ORDER — HYDROCORTISONE VALERATE CREAM 2 MG/G
CREAM TOPICAL
COMMUNITY
Start: 2017-08-11 | End: 2023-05-17 | Stop reason: SDUPTHER

## 2023-05-17 RX ORDER — SEMAGLUTIDE 1.34 MG/ML
0.25 INJECTION, SOLUTION SUBCUTANEOUS WEEKLY
Qty: 1 ADJUSTABLE DOSE PRE-FILLED PEN SYRINGE | Refills: 0 | Status: SHIPPED | OUTPATIENT
Start: 2023-05-17

## 2023-05-17 SDOH — ECONOMIC STABILITY: HOUSING INSECURITY
IN THE LAST 12 MONTHS, WAS THERE A TIME WHEN YOU DID NOT HAVE A STEADY PLACE TO SLEEP OR SLEPT IN A SHELTER (INCLUDING NOW)?: NO

## 2023-05-17 SDOH — ECONOMIC STABILITY: FOOD INSECURITY: WITHIN THE PAST 12 MONTHS, YOU WORRIED THAT YOUR FOOD WOULD RUN OUT BEFORE YOU GOT MONEY TO BUY MORE.: SOMETIMES TRUE

## 2023-05-17 SDOH — ECONOMIC STABILITY: INCOME INSECURITY: HOW HARD IS IT FOR YOU TO PAY FOR THE VERY BASICS LIKE FOOD, HOUSING, MEDICAL CARE, AND HEATING?: NOT VERY HARD

## 2023-05-17 SDOH — ECONOMIC STABILITY: FOOD INSECURITY: WITHIN THE PAST 12 MONTHS, THE FOOD YOU BOUGHT JUST DIDN'T LAST AND YOU DIDN'T HAVE MONEY TO GET MORE.: SOMETIMES TRUE

## 2023-05-17 ASSESSMENT — PATIENT HEALTH QUESTIONNAIRE - PHQ9
SUM OF ALL RESPONSES TO PHQ QUESTIONS 1-9: 0
1. LITTLE INTEREST OR PLEASURE IN DOING THINGS: 0
SUM OF ALL RESPONSES TO PHQ QUESTIONS 1-9: 0
2. FEELING DOWN, DEPRESSED OR HOPELESS: 0
SUM OF ALL RESPONSES TO PHQ9 QUESTIONS 1 & 2: 0
SUM OF ALL RESPONSES TO PHQ QUESTIONS 1-9: 0
SUM OF ALL RESPONSES TO PHQ QUESTIONS 1-9: 0

## 2023-05-17 ASSESSMENT — ENCOUNTER SYMPTOMS
SHORTNESS OF BREATH: 0
VOMITING: 0
CHEST TIGHTNESS: 0
NAUSEA: 0
COUGH: 0
ABDOMINAL PAIN: 0

## 2023-05-17 NOTE — PROGRESS NOTES
1. \"Have you been to the ER, urgent care clinic since your last visit? Hospitalized since your last visit? \" No    2. \"Have you seen or consulted any other health care providers outside of the [address] [place] [address] since your last visit? \" No     3. For patients aged 39-70: Has the patient had a colonoscopy / FIT/ Cologuard? No      If the patient is female:    4. For patients aged 41-77: Has the patient had a mammogram within the past 2 years? No      5. For patients aged 21-65: Has the patient had a pap smear? Yes - Care Gap present.  Most recent result on file

## 2023-05-17 NOTE — PROGRESS NOTES
Rik Altamirano (:  1970) is a 48 y.o. female, here for evaluation of the following medical concerns:  Chief Complaint   Patient presents with    New Patient     Establish care          ASSESSMENT/PLAN:  1. Screening for metabolic disorder    - Hemoglobin A1C; Future    2. Annual physical exam    - CBC with Auto Differential; Future  - Comprehensive Metabolic Panel; Future  - Lipid Panel; Future  - TSH; Future  - Urinalysis with Microscopic; Future  - Hemoglobin A1C; Future    3. History of eczema    - hydrocortisone (WESTCORT) 0.2 % cream; APPLY A DIME SIZED AMOUNT TO AFFECTED AREA TWO TIMES A DAY  Dispense: 1 each; Refill: 2    4. Acute left ankle pain    - External Referral To Podiatry  - predniSONE (DELTASONE) 20 MG tablet; Take 1 tablet by mouth daily for 5 days  Dispense: 10 tablet; Refill: 0    5. Encounter for screening mammogram for malignant neoplasm of breast    - REINIER DIGITAL SCREEN W OR WO CAD BILATERAL; Future    6. Class 3 severe obesity without serious comorbidity with body mass index (BMI) of 45.0 to 49.9 in adult, unspecified obesity type ([facility] [address]    - Semaglutide,0.25 or 0.5MG/DOS, (OZEMPIC, 0.25 OR 0.5 MG/DOSE,) 2 MG/1.5ML SOPN; Inject 0.25 mg into the skin once a week For 4 weeks then inject 0.5 mg weekly  Dispense: 1 Adjustable Dose Pre-filled Pen Syringe; Refill: 0      Return in about 2 weeks (around 2023) for annual physical without pap, labs completed prior, ozempic and ankle. HPI  She is here today to establish care. Needs annual physical     History of hypertension, obesity. Not fasting today. HTN- HCTZ 25 mg with lower leg swelling intermittently, not adding salt, she only drinks water    History of eczema using hydrocortisone cream.     History of x 2 weeks of left ankle seen by one foot two foot, steroid inj and oral prednisone over the last year and now recurring.      Obesity- exercise- walking- try 30 mins, stationary bike 10 mins- 3 times a week, stretching and

## 2023-05-24 ENCOUNTER — TELEPHONE (OUTPATIENT)
Age: 53
End: 2023-05-24

## 2023-06-01 ENCOUNTER — HOSPITAL ENCOUNTER (OUTPATIENT)
Facility: HOSPITAL | Age: 53
Discharge: HOME | End: 2023-06-04

## 2023-06-01 LAB — LABCORP SPECIMEN COLLECTION: NORMAL

## 2023-06-02 LAB
ALBUMIN SERPL-MCNC: 4.1 G/DL (ref 3.8–4.9)
ALBUMIN/GLOB SERPL: 1.4 {RATIO} (ref 1.2–2.2)
ALP SERPL-CCNC: 57 IU/L (ref 44–121)
ALT SERPL-CCNC: 14 IU/L (ref 0–32)
APPEARANCE UR: CLEAR
AST SERPL-CCNC: 15 IU/L (ref 0–40)
BASOPHILS # BLD AUTO: 0 X10E3/UL (ref 0–0.2)
BASOPHILS NFR BLD AUTO: 1 %
BILIRUB SERPL-MCNC: <0.2 MG/DL (ref 0–1.2)
BILIRUB UR QL STRIP: NEGATIVE
BUN SERPL-MCNC: 13 MG/DL (ref 6–24)
BUN/CREAT SERPL: 19 (ref 9–23)
CALCIUM SERPL-MCNC: 8.9 MG/DL (ref 8.7–10.2)
CHLORIDE SERPL-SCNC: 100 MMOL/L (ref 96–106)
CHOLEST SERPL-MCNC: 237 MG/DL (ref 100–199)
CO2 SERPL-SCNC: 25 MMOL/L (ref 20–29)
COLOR UR: YELLOW
CREAT SERPL-MCNC: 0.69 MG/DL (ref 0.57–1)
EGFRCR SERPLBLD CKD-EPI 2021: 104 ML/MIN/1.73
EOSINOPHIL # BLD AUTO: 0.1 X10E3/UL (ref 0–0.4)
EOSINOPHIL NFR BLD AUTO: 1 %
ERYTHROCYTE [DISTWIDTH] IN BLOOD BY AUTOMATED COUNT: 13.4 % (ref 11.7–15.4)
GLOBULIN SER CALC-MCNC: 3 G/DL (ref 1.5–4.5)
GLUCOSE SERPL-MCNC: 99 MG/DL (ref 70–99)
GLUCOSE UR QL STRIP: NEGATIVE
HBA1C MFR BLD: 5.7 % (ref 4.8–5.6)
HBV E AG SERPL QL IA: NEGATIVE
HCT VFR BLD AUTO: 37 % (ref 34–46.6)
HDLC SERPL-MCNC: 87 MG/DL
HGB BLD-MCNC: 12.1 G/DL (ref 11.1–15.9)
HGB UR QL STRIP: NEGATIVE
HIV 1+2 AB+HIV1 P24 AG SERPL QL IA: NON REACTIVE
IMM GRANULOCYTES # BLD AUTO: 0 X10E3/UL (ref 0–0.1)
IMM GRANULOCYTES NFR BLD AUTO: 0 %
KETONES UR QL STRIP: NEGATIVE
LDLC SERPL CALC-MCNC: 139 MG/DL (ref 0–99)
LEUKOCYTE ESTERASE UR QL STRIP: NEGATIVE
LYMPHOCYTES # BLD AUTO: 3.1 X10E3/UL (ref 0.7–3.1)
LYMPHOCYTES NFR BLD AUTO: 40 %
MCH RBC QN AUTO: 27.9 PG (ref 26.6–33)
MCHC RBC AUTO-ENTMCNC: 32.7 G/DL (ref 31.5–35.7)
MCV RBC AUTO: 85 FL (ref 79–97)
MICRO URNS: NORMAL
MONOCYTES # BLD AUTO: 0.7 X10E3/UL (ref 0.1–0.9)
MONOCYTES NFR BLD AUTO: 9 %
NEUTROPHILS # BLD AUTO: 3.9 X10E3/UL (ref 1.4–7)
NEUTROPHILS NFR BLD AUTO: 49 %
NITRITE UR QL STRIP: NEGATIVE
PH UR STRIP: 6 [PH] (ref 5–7.5)
PLATELET # BLD AUTO: 221 X10E3/UL (ref 150–450)
POTASSIUM SERPL-SCNC: 4.1 MMOL/L (ref 3.5–5.2)
PROT SERPL-MCNC: 7.1 G/DL (ref 6–8.5)
PROT UR QL STRIP: NEGATIVE
RBC # BLD AUTO: 4.34 X10E6/UL (ref 3.77–5.28)
SODIUM SERPL-SCNC: 139 MMOL/L (ref 134–144)
SP GR UR STRIP: 1.03 (ref 1–1.03)
SPECIMEN STATUS REPORT: NORMAL
SPECIMEN STATUS REPORT: NORMAL
TRIGL SERPL-MCNC: 64 MG/DL (ref 0–149)
TSH SERPL DL<=0.005 MIU/L-ACNC: 1.98 UIU/ML (ref 0.45–4.5)
UROBILINOGEN UR STRIP-MCNC: 0.2 MG/DL (ref 0.2–1)
VLDLC SERPL CALC-MCNC: 11 MG/DL (ref 5–40)
WBC # BLD AUTO: 7.9 X10E3/UL (ref 3.4–10.8)

## 2023-06-05 LAB
C TRACH RRNA SPEC QL NAA+PROBE: NEGATIVE
N GONORRHOEA RRNA SPEC QL NAA+PROBE: NEGATIVE
T VAGINALIS RRNA SPEC QL NAA+PROBE: NEGATIVE
TREPONEMA PALLIDUM IGG+IGM AB [PRESENCE] IN SERUM OR PLASMA BY IMMUNOASSAY: NON REACTIVE

## 2023-06-07 ENCOUNTER — OFFICE VISIT (OUTPATIENT)
Age: 53
End: 2023-06-07
Payer: COMMERCIAL

## 2023-06-07 VITALS
OXYGEN SATURATION: 96 % | SYSTOLIC BLOOD PRESSURE: 145 MMHG | TEMPERATURE: 98.4 F | BODY MASS INDEX: 49.65 KG/M2 | HEIGHT: 62 IN | HEART RATE: 81 BPM | WEIGHT: 269.8 LBS | DIASTOLIC BLOOD PRESSURE: 63 MMHG | RESPIRATION RATE: 19 BRPM

## 2023-06-07 DIAGNOSIS — R73.03 PREDIABETES: ICD-10-CM

## 2023-06-07 DIAGNOSIS — R01.1 NEWLY RECOGNIZED HEART MURMUR: ICD-10-CM

## 2023-06-07 DIAGNOSIS — E66.01 MORBID OBESITY WITH BMI OF 40.0-44.9, ADULT (HCC): ICD-10-CM

## 2023-06-07 DIAGNOSIS — Z00.00 ANNUAL PHYSICAL EXAM: Primary | ICD-10-CM

## 2023-06-07 PROCEDURE — 3077F SYST BP >= 140 MM HG: CPT | Performed by: NURSE PRACTITIONER

## 2023-06-07 PROCEDURE — 99386 PREV VISIT NEW AGE 40-64: CPT | Performed by: NURSE PRACTITIONER

## 2023-06-07 PROCEDURE — 3078F DIAST BP <80 MM HG: CPT | Performed by: NURSE PRACTITIONER

## 2023-06-07 RX ORDER — SEMAGLUTIDE 1.34 MG/ML
0.25 INJECTION, SOLUTION SUBCUTANEOUS WEEKLY
Qty: 1 ADJUSTABLE DOSE PRE-FILLED PEN SYRINGE | Refills: 0 | Status: SHIPPED | OUTPATIENT
Start: 2023-06-07

## 2023-06-07 ASSESSMENT — ENCOUNTER SYMPTOMS
SHORTNESS OF BREATH: 0
NAUSEA: 0
CHEST TIGHTNESS: 0
ABDOMINAL PAIN: 0
VOMITING: 0
COUGH: 0

## 2023-06-07 ASSESSMENT — PATIENT HEALTH QUESTIONNAIRE - PHQ9
1. LITTLE INTEREST OR PLEASURE IN DOING THINGS: 0
SUM OF ALL RESPONSES TO PHQ9 QUESTIONS 1 & 2: 0
SUM OF ALL RESPONSES TO PHQ QUESTIONS 1-9: 0
2. FEELING DOWN, DEPRESSED OR HOPELESS: 0
SUM OF ALL RESPONSES TO PHQ QUESTIONS 1-9: 0

## 2023-06-07 NOTE — PROGRESS NOTES
1. \"Have you been to the ER, urgent care clinic since your last visit? Hospitalized since your last visit? \" No    2. \"Have you seen or consulted any other health care providers outside of the [address] [place] [address] since your last visit? \" No     3. For patients aged 39-70: Has the patient had a colonoscopy / FIT/ Cologuard? No      If the patient is female:    4. For patients aged 41-77: Has the patient had a mammogram within the past 2 years? No      5. For patients aged 21-65: Has the patient had a pap smear? Yes - Care Gap present.  Most recent result on file

## 2023-06-07 NOTE — PROGRESS NOTES
Dana Andrade (:  1970) is a 48 y.o. female, here for evaluation of the following medical concerns:  Chief Complaint   Patient presents with    Annual Exam    Discuss Medications     ozempic    Ankle Pain     Left          ASSESSMENT/PLAN:  1. Annual physical exam      2. Morbid obesity with BMI of 40.0-44.9, adult ([facility] [address]    - Semaglutide,0.25 or 0.5MG/DOS, (OZEMPIC, 0.25 OR 0.5 MG/DOSE,) 2 MG/1.5ML SOPN; Inject 0.25 mg into the skin once a week For 4 weeks then inject 0.5 mg weekly  Dispense: 1 Adjustable Dose Pre-filled Pen Syringe; Refill: 0    3. Need for Tdap vaccination      4. Need for shingles vaccine      5. Prediabetes    - Semaglutide,0.25 or 0.5MG/DOS, (OZEMPIC, 0.25 OR 0.5 MG/DOSE,) 2 MG/1.5ML SOPN; Inject 0.25 mg into the skin once a week For 4 weeks then inject 0.5 mg weekly  Dispense: 1 Adjustable Dose Pre-filled Pen Syringe; Refill: 0    6. Newly recognized heart murmur    - [address] Lashae Paz, DO, Cardiology, [facility])      Return in about 1 week (around 2023) for pap and vaccines, 3 months for prediabetes, chol, htn, fasting labs. HPI  She is here today for annual physical exam without pap    Ozempic denied due to diagnosis will trial under  and ankle concerns resolved    History of hypertension, obesity. Not fasting today. HTN- HCTZ 25 mg with lower leg swelling intermittently, not adding salt, she only drinks water     History of eczema using hydrocortisone cream.      History of x 2 weeks of left ankle seen by one foot two foot, steroid inj and oral prednisone over the last year and now recurring. Obesity- exercise- walking- try 30 mins, stationary bike 10 mins- 3 times a week, stretching and squats, walking at work. She is working [employer]. Diet stopped soda, does not eat sweets, no bread. States her insurance does not pay for surgical weight loss programs. Not snacking late, not a snacker.  If working eating three meals a day, if home then

## 2023-06-27 ENCOUNTER — HOSPITAL ENCOUNTER (OUTPATIENT)
Facility: HOSPITAL | Age: 53
Discharge: HOME | End: 2023-06-30
Payer: COMMERCIAL

## 2023-06-27 DIAGNOSIS — Z12.31 VISIT FOR SCREENING MAMMOGRAM: ICD-10-CM

## 2023-06-27 PROCEDURE — 77063 BREAST TOMOSYNTHESIS BI: CPT

## 2023-06-28 ENCOUNTER — TELEPHONE (OUTPATIENT)
Age: 53
End: 2023-06-28

## 2023-06-28 NOTE — TELEPHONE ENCOUNTER
Patient was informed that ozempic was denied. Patient would like a referral to weight loss clinic.      Gina Varela

## 2023-06-29 DIAGNOSIS — E66.01 MORBID OBESITY WITH BMI OF 40.0-44.9, ADULT (HCC): Primary | ICD-10-CM

## 2023-07-05 ENCOUNTER — HOSPITAL ENCOUNTER (OUTPATIENT)
Facility: HOSPITAL | Age: 53
Setting detail: SPECIMEN
Discharge: HOME | End: 2023-07-08
Payer: COMMERCIAL

## 2023-07-05 ENCOUNTER — OFFICE VISIT (OUTPATIENT)
Age: 53
End: 2023-07-05
Payer: COMMERCIAL

## 2023-07-05 VITALS
HEIGHT: 62 IN | HEART RATE: 74 BPM | RESPIRATION RATE: 17 BRPM | DIASTOLIC BLOOD PRESSURE: 63 MMHG | SYSTOLIC BLOOD PRESSURE: 148 MMHG | BODY MASS INDEX: 50.38 KG/M2 | OXYGEN SATURATION: 96 % | TEMPERATURE: 97.9 F | WEIGHT: 273.8 LBS

## 2023-07-05 DIAGNOSIS — L29.9 GENERALIZED PRURITUS: Primary | ICD-10-CM

## 2023-07-05 DIAGNOSIS — E78.00 ELEVATED LDL CHOLESTEROL LEVEL: ICD-10-CM

## 2023-07-05 DIAGNOSIS — R73.03 PREDIABETES: ICD-10-CM

## 2023-07-05 DIAGNOSIS — Z12.4 CERVICAL CANCER SCREENING: ICD-10-CM

## 2023-07-05 DIAGNOSIS — E66.01 MORBID OBESITY WITH BMI OF 40.0-44.9, ADULT (HCC): ICD-10-CM

## 2023-07-05 PROCEDURE — 3078F DIAST BP <80 MM HG: CPT | Performed by: NURSE PRACTITIONER

## 2023-07-05 PROCEDURE — 3077F SYST BP >= 140 MM HG: CPT | Performed by: NURSE PRACTITIONER

## 2023-07-05 PROCEDURE — 99213 OFFICE O/P EST LOW 20 MIN: CPT | Performed by: NURSE PRACTITIONER

## 2023-07-05 PROCEDURE — 87624 HPV HI-RISK TYP POOLED RSLT: CPT

## 2023-07-05 PROCEDURE — 87591 N.GONORRHOEAE DNA AMP PROB: CPT

## 2023-07-05 PROCEDURE — 87491 CHLMYD TRACH DNA AMP PROBE: CPT

## 2023-07-05 PROCEDURE — 87661 TRICHOMONAS VAGINALIS AMPLIF: CPT

## 2023-07-05 PROCEDURE — 88175 CYTOPATH C/V AUTO FLUID REDO: CPT

## 2023-07-05 ASSESSMENT — PATIENT HEALTH QUESTIONNAIRE - PHQ9
SUM OF ALL RESPONSES TO PHQ QUESTIONS 1-9: 0
SUM OF ALL RESPONSES TO PHQ9 QUESTIONS 1 & 2: 0
SUM OF ALL RESPONSES TO PHQ QUESTIONS 1-9: 0
2. FEELING DOWN, DEPRESSED OR HOPELESS: 0
1. LITTLE INTEREST OR PLEASURE IN DOING THINGS: 0
SUM OF ALL RESPONSES TO PHQ QUESTIONS 1-9: 0
SUM OF ALL RESPONSES TO PHQ QUESTIONS 1-9: 0

## 2023-07-05 ASSESSMENT — ENCOUNTER SYMPTOMS
SHORTNESS OF BREATH: 0
COUGH: 0
ABDOMINAL PAIN: 0
VOMITING: 0
CHEST TIGHTNESS: 0
NAUSEA: 0

## 2023-07-05 NOTE — PROGRESS NOTES
1. \"Have you been to the ER, urgent care clinic since your last visit? Hospitalized since your last visit? \" No    2. \"Have you seen or consulted any other health care providers outside of the [address] [place] [address] since your last visit? \" No     3. For patients aged 43-73: Has the patient had a colonoscopy / FIT/ Cologuard? No      If the patient is female:    4. For patients aged 43-66: Has the patient had a mammogram within the past 2 years? No      5. For patients aged 21-65: Has the patient had a pap smear? Yes - Care Gap present.  Most recent result on file
11-7 W, F- 16 hours, home t, th, sat, sun. She has tried keto diet with some 10 lb weight loss. Ozempic was denied. She is under the care of JAX Steen for robert colonoscopy 7/18/2023  Mammogram [facility]  Mammogram Result (most recent):  REINIER LAINE DIGITAL SCREEN BILATERAL 06/27/2023    Narrative  Digital mammogram bilateral screening with 3-D tomography and CAD    History: Routine screening    Comparison: 2022, 2020, 2019, 2016    Findings:  2-D and 3-D digital CC and MLO views obtained of the breasts. No suspicious mass, distortion or suspicious microcalcifications. Stable minimal  postsurgical changes left breast.    Interpretation performed in conjunction with computer assisted detection (CAD). Impression  No mammographic evidence of malignancy. Routine followup recommended with annual screening mammography. Category 2: Benign      Density B: There are scattered areas of fibroglandular density. Garrison is scheduled     Prediabetes-           Hemoglobin A1C   Date Value Ref Range Status   06/01/2023 5.7 (H) 4.8 - 5.6 % Final       Comment:                Prediabetes: 5.7 - 6.4           Diabetes: >6.4           Glycemic control for adults with diabetes: <7.0      Needs diet changes and exercise management  Eye- completed last week and told needs glasses  Dental- needs     Hyperlipidemia- diet changes needed-   The 10-year ASCVD risk score (Ta COMER, et al., 2019) is: 4.7%    Values used to calculate the score:      Age: 48 years      Sex: Female      Is Non- : Yes      Diabetic: No      Tobacco smoker: No      Systolic Blood Pressure: 572 mmHg      Is BP treated: Yes      HDL Cholesterol: 87 mg/dL      Total Cholesterol: 237 mg/dL     Review of Systems   Constitutional:  Negative for diaphoresis and fatigue. Respiratory:  Negative for cough, chest tightness and shortness of breath. Cardiovascular:  Negative for chest pain, palpitations and leg swelling.

## 2023-07-07 DIAGNOSIS — R60.0 LOCALIZED EDEMA: ICD-10-CM

## 2023-07-07 DIAGNOSIS — I10 ESSENTIAL (PRIMARY) HYPERTENSION: ICD-10-CM

## 2023-07-07 RX ORDER — HYDROCHLOROTHIAZIDE 25 MG/1
TABLET ORAL
Qty: 90 TABLET | Refills: 0 | OUTPATIENT
Start: 2023-07-07

## 2023-07-10 LAB
C TRACH RRNA SPEC QL NAA+PROBE: NEGATIVE
N GONORRHOEA RRNA SPEC QL NAA+PROBE: NEGATIVE
SPECIMEN SOURCE: NORMAL
T VAGINALIS RRNA SPEC QL NAA+PROBE: NEGATIVE

## 2023-07-11 DIAGNOSIS — B96.89 BACTERIAL VAGINOSIS: Primary | ICD-10-CM

## 2023-07-11 DIAGNOSIS — N76.0 BACTERIAL VAGINOSIS: Primary | ICD-10-CM

## 2023-07-11 RX ORDER — METRONIDAZOLE 7.5 MG/G
GEL VAGINAL
Qty: 5 EACH | Refills: 0 | Status: SHIPPED | OUTPATIENT
Start: 2023-07-11 | End: 2023-07-12

## 2023-07-12 ENCOUNTER — OFFICE VISIT (OUTPATIENT)
Age: 53
End: 2023-07-12
Payer: COMMERCIAL

## 2023-07-12 VITALS
BODY MASS INDEX: 50.05 KG/M2 | HEART RATE: 82 BPM | WEIGHT: 272 LBS | HEIGHT: 62 IN | DIASTOLIC BLOOD PRESSURE: 84 MMHG | OXYGEN SATURATION: 95 % | SYSTOLIC BLOOD PRESSURE: 124 MMHG

## 2023-07-12 DIAGNOSIS — N76.0 BV (BACTERIAL VAGINOSIS): Primary | ICD-10-CM

## 2023-07-12 DIAGNOSIS — R01.1 NEWLY RECOGNIZED HEART MURMUR: Primary | ICD-10-CM

## 2023-07-12 DIAGNOSIS — R07.9 CHEST PAIN, UNSPECIFIED TYPE: ICD-10-CM

## 2023-07-12 DIAGNOSIS — B96.89 BV (BACTERIAL VAGINOSIS): Primary | ICD-10-CM

## 2023-07-12 PROCEDURE — 3079F DIAST BP 80-89 MM HG: CPT | Performed by: INTERNAL MEDICINE

## 2023-07-12 PROCEDURE — 3074F SYST BP LT 130 MM HG: CPT | Performed by: INTERNAL MEDICINE

## 2023-07-12 PROCEDURE — 99204 OFFICE O/P NEW MOD 45 MIN: CPT | Performed by: INTERNAL MEDICINE

## 2023-07-12 PROCEDURE — 93000 ELECTROCARDIOGRAM COMPLETE: CPT | Performed by: INTERNAL MEDICINE

## 2023-07-12 RX ORDER — METRONIDAZOLE 7.5 MG/G
GEL VAGINAL
Qty: 5 EACH | Refills: 0 | Status: SHIPPED | OUTPATIENT
Start: 2023-07-12 | End: 2023-07-19

## 2023-07-12 ASSESSMENT — PATIENT HEALTH QUESTIONNAIRE - PHQ9
SUM OF ALL RESPONSES TO PHQ QUESTIONS 1-9: 0
SUM OF ALL RESPONSES TO PHQ9 QUESTIONS 1 & 2: 0
1. LITTLE INTEREST OR PLEASURE IN DOING THINGS: 0
SUM OF ALL RESPONSES TO PHQ QUESTIONS 1-9: 0
SUM OF ALL RESPONSES TO PHQ QUESTIONS 1-9: 0
2. FEELING DOWN, DEPRESSED OR HOPELESS: 0
SUM OF ALL RESPONSES TO PHQ QUESTIONS 1-9: 0

## 2023-07-12 NOTE — PROGRESS NOTES
Lydia Rojas    Chief Complaint   Patient presents with    New Patient     Est care referred by pcp due to     Chest Pain     Left chest stabbing/pressure pains on/off    Palpitations     Racing palps     Shortness of Breath     SOB with exertion     Edema     Bilateral leg edema constant        HPI    Lydia Rojas is a 48 y.o. obese AAF with no known heart disease, referred for persistent CP, palps, SOB and edema. Pt says has h/o some type of heart murmur. Her prior PCP Dr. Payton Cevallos sent her to a Cardiologist years ago but we were unable to obtain/ find any records. She thinks had testing but again cant recall details. More recently, has been having very regular, worsening chest pain. Feels sharp, center but could be all over her chest and scares her a lot. She will try to drink water, uses ice packs etc and unsure if anything makes it better or worse. She gets SOB very easy, edema but no syncope. She has longstanding HTN. Past Medical History:   Diagnosis Date    Benign essential hypertension     Eczema     Environmental allergies        Past Surgical History:   Procedure Laterality Date    BREAST BIOPSY Left 6/16/2016    LEFT BREAST NEEDLE LOCALIZED BIOPSY performed by Lennox Denis, MD at [address] [place] [address]       Current Outpatient Medications   Medication Sig Dispense Refill    Calcium Carb-Cholecalciferol 600-12.5 MG-MCG CAPS Take by mouth daily      hydroCHLOROthiazide (HYDRODIURIL) 25 MG tablet TAKE 1 TABLET BY MOUTH EVERY DAY 90 tablet 0     No current facility-administered medications for this visit.        Allergies   Allergen Reactions    Latex Hives and Swelling       Social History     Socioeconomic History    Marital status:      Spouse name: Not on file    Number of children: Not on file    Years of education: Not on file    Highest education level: Not on file   Occupational History    Not on file   Tobacco Use    Smoking status: Never    Smokeless

## 2023-07-12 NOTE — PROGRESS NOTES
Chao Borges presents today for   Chief Complaint   Patient presents with    New Patient     Est care referred by pcp due to     Chest Pain     Left chest stabbing/pressure pains on/off    Palpitations     Racing palps     Shortness of Breath     SOB with exertion     Edema     Bilateral leg edema constant        Leslie Maciel Ala preferred language for health care discussion is english/other. Is someone accompanying this pt? no    Is the patient using any DME equipment during OV? no    Depression Screening:  Depression: Not at risk    PHQ-2 Score: 0        Learning Assessment:  Who is the primary learner? Patient    What is the preferred language for health care of the primary learner? ENGLISH    How does the primary learner prefer to learn new concepts? DEMONSTRATION    Answered By patient    Relationship to Learner SELF           Pt currently taking Anticoagulant therapy? no    Pt currently taking Antiplatelet therapy ? no      Coordination of Care:  1. Have you been to the ER, urgent care clinic since your last visit? Hospitalized since your last visit? no    2. Have you seen or consulted any other health care providers outside of the [address] [place] [address] since your last visit? Include any pap smears or colon screening.  no

## 2023-07-17 DIAGNOSIS — B96.89 BV (BACTERIAL VAGINOSIS): Primary | ICD-10-CM

## 2023-07-17 DIAGNOSIS — N76.0 BV (BACTERIAL VAGINOSIS): Primary | ICD-10-CM

## 2023-07-17 RX ORDER — METRONIDAZOLE 500 MG/1
500 TABLET ORAL 2 TIMES DAILY
Qty: 14 TABLET | Refills: 0 | Status: SHIPPED | OUTPATIENT
Start: 2023-07-17 | End: 2023-07-24

## 2023-08-12 DIAGNOSIS — R60.0 LOCALIZED EDEMA: ICD-10-CM

## 2023-08-12 DIAGNOSIS — I10 ESSENTIAL (PRIMARY) HYPERTENSION: ICD-10-CM

## 2023-08-14 RX ORDER — HYDROCHLOROTHIAZIDE 25 MG/1
TABLET ORAL
Qty: 90 TABLET | Refills: 0 | OUTPATIENT
Start: 2023-08-14

## 2023-08-14 RX ORDER — HYDROCHLOROTHIAZIDE 25 MG/1
25 TABLET ORAL DAILY
Qty: 90 TABLET | Refills: 0 | OUTPATIENT
Start: 2023-08-14

## 2023-08-15 DIAGNOSIS — I10 ESSENTIAL (PRIMARY) HYPERTENSION: ICD-10-CM

## 2023-08-15 DIAGNOSIS — R60.0 LOCALIZED EDEMA: ICD-10-CM

## 2023-08-15 NOTE — TELEPHONE ENCOUNTER
----- Message from Pooja Sandoval sent at 8/14/2023 10:19 AM EDT -----  Subject: Refill Request    QUESTIONS  Name of Medication? hydroCHLOROthiazide (HYDRODIURIL) 25 MG tablet  Patient-reported dosage and instructions? 1 tablet daily  How many days do you have left? 0  Preferred Pharmacy? [id number]  Pharmacy phone number (if available)? [phone]  Additional Information for Provider? Pt has been out of this medication   for 3 days. ---------------------------------------------------------------------------  --------------  Maciej Carina INFO  What is the best way for the office to contact you? Do not leave any   message, patient will call back for answer,OK to respond with electronic   message via [facility] portal (only for patients who have registered [facility]   account)  Preferred Call Back Phone Number? [phone]  ---------------------------------------------------------------------------  --------------  SCRIPT ANSWERS  Relationship to Patient?  Self

## 2023-08-15 NOTE — TELEPHONE ENCOUNTER
Last Visit: 07- OV   Next Appointment: 09-  Previous Refill Encounter: 04- #90 tabs with 0 refills      Requested Prescriptions     Pending Prescriptions Disp Refills    hydroCHLOROthiazide (HYDRODIURIL) 25 MG tablet 90 tablet 0     Sig: Take 1 tablet by mouth daily

## 2023-08-16 RX ORDER — HYDROCHLOROTHIAZIDE 25 MG/1
25 TABLET ORAL DAILY
Qty: 90 TABLET | Refills: 1 | Status: SHIPPED | OUTPATIENT
Start: 2023-08-16

## 2023-08-16 RX ORDER — HYDROCHLOROTHIAZIDE 25 MG/1
TABLET ORAL
Qty: 90 TABLET | Refills: 0 | OUTPATIENT
Start: 2023-08-16

## 2023-10-04 NOTE — ED PROVIDER NOTES
EMERGENCY DEPARTMENT HISTORY AND PHYSICAL EXAM    5:41 PM      Date: 3/14/2020  Patient Name: Reyna Xiao    History of Presenting Illness     Chief Complaint   Patient presents with    Nasal Congestion    Generalized Body Aches    Cough    Sneezing       History Provided By: Patient    Chief Complaint: cough, congestion, sneezing, body aches  Duration: 3 Days  Timing:  Acute  Location:   Quality: Aching  Severity: Moderate  Modifying Factors: none  Associated Symptoms: denies any other associated signs or symptoms      Additional History (Context):Tracie Helm is a 52 y.o. female with a pertinent history of environmental allergies, eczema, hypertension who presents to the emergency department for evaluation of cough, congestion, sneezing, and body aches for the past 3 days. 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 Patient states most individuals in the nursing home use the same elevator. This occurred within the last week or 2. Patient states the resident was not tested for COVID-19. Patient denies any known exposure to individuals who have tested positive for COVID-19. No recent fever, chills, vomiting, diarrhea, urinary symptoms, possibility of pregnancy, or any other complaints. PCP:  SAHARA Leija      Current Outpatient Medications   Medication Sig Dispense Refill    hydroCHLOROthiazide (HYDRODIURIL) 25 mg tablet Take 1 Tab by mouth daily. 90 Tab 1    ibuprofen (MOTRIN) 800 mg tablet TAKE 1 TAB BY MOUTH EVERY EIGHT (8) HOURS AS NEEDED FOR PAIN.  80 Tab 2       Past History     Past Medical History:  Past Medical History:   Diagnosis Date    Benign essential hypertension     Eczema     Environmental allergies        Past Surgical History:  Past Surgical History:   Procedure Laterality Date    HX BREAST BIOPSY Left 6/16/2016    LEFT BREAST NEEDLE LOCALIZED BIOPSY performed by Jay Baig RN received return call from [address] [place] [address] at patient PCP office. Per PCP patient is to hold Xarelto 24 hours prior to biopsy. Patient will be informed of information if biopsy is need after film consult.      Electronically signed by Josette Gonzales RN on 10/4/23 at 8:49 AM EDT Jian Strickland MD at [facility] MAIN OR    HX TUBAL LIGATION  1994       Family History:  Family History   Problem Relation Age of Onset   Dae Rangel Cancer Mother         unsure what kind    Hypertension Father     Heart Attack Father        Social History:  Social History     Tobacco Use    Smoking status: Never Smoker    Smokeless tobacco: Never Used   Substance Use Topics    Alcohol use: Yes     Alcohol/week: 2.0 - 3.0 standard drinks     Types: 1 Glasses of wine, 1 - 2 Standard drinks or equivalent per week     Comment: 2-3 times per week    Drug use: No       Allergies: Allergies   Allergen Reactions    Latex Hives         Review of Systems       Review of Systems   Constitutional: Negative for chills and fever. HENT: Positive for congestion and sneezing. Negative for rhinorrhea and sore throat. Respiratory: Positive for cough. Negative for shortness of breath. Cardiovascular: Negative for chest pain. Gastrointestinal: Negative for abdominal pain, blood in stool, constipation, diarrhea, nausea and vomiting. Genitourinary: Negative for dysuria, frequency and hematuria. Musculoskeletal: Positive for myalgias. Negative for back pain. Skin: Negative for rash and wound. Neurological: Negative for dizziness and headaches. All other systems reviewed and are negative. Physical Exam     Visit Vitals  /85 (BP 1 Location: Left arm)   Pulse 95   Temp 98.4 °F (36.9 °C)   Resp 20   Ht 5' 2\" (1.575 m)   Wt 108.9 kg (240 lb)   LMP 01/30/2020   SpO2 98%   BMI 43.90 kg/m²       Physical Exam  Vitals signs and nursing note reviewed. Constitutional:       General: She is not in acute distress. Appearance: She is well-developed. She is not diaphoretic. HENT:      Head: Normocephalic and atraumatic. Nose: Congestion and rhinorrhea present.       Comments: Erythematous, edematous bilateral nasal turbinates with clear rhinorrhea     Mouth/Throat:      Comments: Mild bilateral conjunctival injection  Eyes:      Conjunctiva/sclera: Conjunctivae normal.   Neck:      Musculoskeletal: Normal range of motion and neck supple. Cardiovascular:      Rate and Rhythm: Normal rate and regular rhythm. Heart sounds: Normal heart sounds. Pulmonary:      Effort: Pulmonary effort is normal. No respiratory distress. Breath sounds: Normal breath sounds. No stridor. No wheezing, rhonchi or rales. Comments: Lungs are clear to auscultation bilaterally  Chest:      Chest wall: No tenderness. Abdominal:      General: Bowel sounds are normal. There is no distension. Palpations: Abdomen is soft. Tenderness: There is no abdominal tenderness. There is no guarding or rebound. Musculoskeletal:         General: No deformity. Skin:     General: Skin is warm and dry. Neurological:      Mental Status: She is alert and oriented to person, place, and time. Deep Tendon Reflexes: Reflexes are normal and symmetric. Diagnostic Study Results     Labs -  No results found for this or any previous visit (from the past 12 hour(s)). Radiologic Studies -   No results found. Medical Decision Making   I am the first provider for this patient. I reviewed the vital signs, available nursing notes, past medical history, past surgical history, family history and social history. Vital Signs-Reviewed the patient's vital signs. Pulse Oximetry Analysis -  98% on room air (Interpretation)    Records Reviewed: Nursing Notes and Old Medical Records (Time of Review: 5:41 PM)    ED Course: Progress Notes, Reevaluation, and Consults:    Provider Notes (Medical Decision Making):   differential diagnosis: Influenza, other benign viral etiology, COVID-19    Plan: Patient presents ambulatory in no significant distress with normal vitals. Exam and HPI consistent with influenza versus other viral etiology. Patient with negative influenza test.  Discussed with JUAN ANTONIO Beard.   Patient does not meet H/CDC criteria for COVID-19 testing. Will obtain specimen swabs and sent to [address] [place] [address].  Patient advised to self isolate. Work note provided. At this time, patient is stable and appropriate for discharge home. Patient demonstrates understanding of current diagnoses and is in agreement with the treatment plan. They are advised that while the likelihood of serious underlying condition is low at this point given the evaluation performed today, we cannot fully rule it out. They are advised to immediately return with any new symptoms or worsening of current condition. All questions have been answered. Patient is given educational material regarding their diagnoses, including danger symptoms and when to return to the ED. Diagnosis     Clinical Impression:   1. Viral URI with cough        Disposition: DC Home    Follow-up Information     Follow up With Specialties Details Why Contact Info    SAHARA Bragg Physician Assistant Call in 1 day As needed [facility] [address] [place] [address] DEPT Emergency Medicine Go to As needed, If symptoms worsen [facility]  [phone]           Discharge Medication List as of 3/14/2020  8:30 PM      CONTINUE these medications which have NOT CHANGED    Details   hydroCHLOROthiazide (HYDRODIURIL) 25 mg tablet Take 1 Tab by mouth daily. , Normal, Disp-90 Tab, R-1      ibuprofen (MOTRIN) 800 mg tablet TAKE 1 TAB BY MOUTH EVERY EIGHT (8) HOURS AS NEEDED FOR PAIN., Normal, Disp-90 Tab, R-2           _______________________________    This note was dictated utilizing voice recognition software which may lead to typographical errors. I apologize in advance if the situation occurs. If questions arise please do not hesitate to contact me or call our department.   Lillian Tsai PA-C

## 2024-02-16 DIAGNOSIS — R60.0 LOCALIZED EDEMA: ICD-10-CM

## 2024-02-16 DIAGNOSIS — I10 ESSENTIAL (PRIMARY) HYPERTENSION: ICD-10-CM

## 2024-02-16 NOTE — TELEPHONE ENCOUNTER
Medication name:hydroCHLOROthiazide (HYDRODIURIL) 25 MG tablet     Last appointment:08/16/24    Next appointment:not scheduled (patient was contacted via [employer] to schedule an apt)    : 11/16/23    Pharmacy: [facility] pharmacy

## 2024-02-19 RX ORDER — HYDROCHLOROTHIAZIDE 25 MG/1
25 TABLET ORAL DAILY
Qty: 30 TABLET | Refills: 0 | Status: SHIPPED | OUTPATIENT
Start: 2024-02-19

## 2024-03-20 DIAGNOSIS — R60.0 LOCALIZED EDEMA: ICD-10-CM

## 2024-03-20 DIAGNOSIS — I10 ESSENTIAL (PRIMARY) HYPERTENSION: ICD-10-CM

## 2024-03-22 NOTE — TELEPHONE ENCOUNTER
Medication name: hydroCHLOROthiazide (HYDRODIURIL) 25 MG tablet     Last appointment: 02/19/24    Next appointment: needs to schedule. Message informing was sent to patient.     : 02/19/24    Pharmacy:   [id number] - [place], [place] - [address] [place] [address]

## 2024-03-25 RX ORDER — HYDROCHLOROTHIAZIDE 25 MG/1
25 TABLET ORAL DAILY
Qty: 30 TABLET | Refills: 0 | Status: SHIPPED | OUTPATIENT
Start: 2024-03-25

## 2024-06-19 ENCOUNTER — HOSPITAL ENCOUNTER (OUTPATIENT)
Facility: HOSPITAL | Age: 54
Setting detail: SPECIMEN
Discharge: HOME | End: 2024-06-22
Payer: COMMERCIAL

## 2024-06-19 DIAGNOSIS — R73.03 PREDIABETES: ICD-10-CM

## 2024-06-19 DIAGNOSIS — E78.2 MIXED HYPERLIPIDEMIA: ICD-10-CM

## 2024-06-19 DIAGNOSIS — I10 ESSENTIAL HYPERTENSION: ICD-10-CM

## 2024-06-19 DIAGNOSIS — R82.90 CLOUDY URINE: ICD-10-CM

## 2024-06-19 DIAGNOSIS — R35.0 URINE FREQUENCY: ICD-10-CM

## 2024-06-19 DIAGNOSIS — R60.0 LOWER EXTREMITY EDEMA: ICD-10-CM

## 2024-06-19 LAB
ALBUMIN SERPL-MCNC: 3.7 G/DL (ref 3.4–5)
ALBUMIN/GLOB SERPL: 1 (ref 0.8–1.7)
ALP SERPL-CCNC: 57 U/L (ref 45–117)
ALT SERPL-CCNC: 27 U/L (ref 13–56)
ANION GAP SERPL CALC-SCNC: 5 MMOL/L (ref 3–18)
APPEARANCE UR: CLEAR
AST SERPL-CCNC: 14 U/L (ref 10–38)
BACTERIA URNS QL MICRO: NEGATIVE /HPF
BASOPHILS # BLD: 0 K/UL (ref 0–0.1)
BASOPHILS NFR BLD: 1 % (ref 0–2)
BILIRUB SERPL-MCNC: 0.3 MG/DL (ref 0.2–1)
BILIRUB UR QL: NEGATIVE
BUN SERPL-MCNC: 15 MG/DL (ref 7–18)
BUN/CREAT SERPL: 19 (ref 12–20)
CALCIUM SERPL-MCNC: 9.2 MG/DL (ref 8.5–10.1)
CHLORIDE SERPL-SCNC: 104 MMOL/L (ref 100–111)
CHOLEST SERPL-MCNC: 213 MG/DL
CO2 SERPL-SCNC: 29 MMOL/L (ref 21–32)
COLOR UR: YELLOW
CREAT SERPL-MCNC: 0.8 MG/DL (ref 0.6–1.3)
DIFFERENTIAL METHOD BLD: ABNORMAL
EOSINOPHIL # BLD: 0.3 K/UL (ref 0–0.4)
EOSINOPHIL NFR BLD: 5 % (ref 0–5)
EPITH CASTS URNS QL MICRO: NORMAL /LPF (ref 0–5)
ERYTHROCYTE [DISTWIDTH] IN BLOOD BY AUTOMATED COUNT: 14.7 % (ref 11.6–14.5)
EST. AVERAGE GLUCOSE BLD GHB EST-MCNC: 114 MG/DL
GLOBULIN SER CALC-MCNC: 3.8 G/DL (ref 2–4)
GLUCOSE SERPL-MCNC: 110 MG/DL (ref 74–99)
GLUCOSE UR STRIP.AUTO-MCNC: NEGATIVE MG/DL
HBA1C MFR BLD: 5.6 % (ref 4.2–5.6)
HCT VFR BLD AUTO: 39.6 % (ref 35–45)
HDLC SERPL-MCNC: 63 MG/DL (ref 40–60)
HDLC SERPL: 3.4 (ref 0–5)
HGB BLD-MCNC: 12.1 G/DL (ref 12–16)
HGB UR QL STRIP: NEGATIVE
IMM GRANULOCYTES # BLD AUTO: 0 K/UL (ref 0–0.04)
IMM GRANULOCYTES NFR BLD AUTO: 0 % (ref 0–0.5)
KETONES UR QL STRIP.AUTO: NEGATIVE MG/DL
LDLC SERPL CALC-MCNC: 137 MG/DL (ref 0–100)
LEUKOCYTE ESTERASE UR QL STRIP.AUTO: NEGATIVE
LIPID PANEL: ABNORMAL
LYMPHOCYTES # BLD: 2.2 K/UL (ref 0.9–3.6)
LYMPHOCYTES NFR BLD: 45 % (ref 21–52)
MCH RBC QN AUTO: 27.5 PG (ref 24–34)
MCHC RBC AUTO-ENTMCNC: 30.6 G/DL (ref 31–37)
MCV RBC AUTO: 90 FL (ref 78–100)
MONOCYTES # BLD: 0.6 K/UL (ref 0.05–1.2)
MONOCYTES NFR BLD: 13 % (ref 3–10)
NEUTS SEG # BLD: 1.7 K/UL (ref 1.8–8)
NEUTS SEG NFR BLD: 36 % (ref 40–73)
NITRITE UR QL STRIP.AUTO: NEGATIVE
NRBC # BLD: 0 K/UL (ref 0–0.01)
NRBC BLD-RTO: 0 PER 100 WBC
PH UR STRIP: 5.5 (ref 5–8)
PLATELET # BLD AUTO: 216 K/UL (ref 135–420)
PMV BLD AUTO: 12.1 FL (ref 9.2–11.8)
POTASSIUM SERPL-SCNC: 4.1 MMOL/L (ref 3.5–5.5)
PROT SERPL-MCNC: 7.5 G/DL (ref 6.4–8.2)
PROT UR STRIP-MCNC: NEGATIVE MG/DL
RBC # BLD AUTO: 4.4 M/UL (ref 4.2–5.3)
RBC #/AREA URNS HPF: NEGATIVE /HPF (ref 0–5)
SODIUM SERPL-SCNC: 138 MMOL/L (ref 136–145)
SP GR UR REFRACTOMETRY: 1.02 (ref 1–1.03)
TRIGL SERPL-MCNC: 65 MG/DL
TSH SERPL DL<=0.05 MIU/L-ACNC: 2.52 UIU/ML (ref 0.36–3.74)
UROBILINOGEN UR QL STRIP.AUTO: 0.2 EU/DL (ref 0.2–1)
VLDLC SERPL CALC-MCNC: 13 MG/DL
WBC # BLD AUTO: 4.8 K/UL (ref 4.6–13.2)
WBC URNS QL MICRO: NORMAL /HPF (ref 0–4)

## 2024-06-19 PROCEDURE — 80061 LIPID PANEL: CPT

## 2024-06-19 PROCEDURE — 36415 COLL VENOUS BLD VENIPUNCTURE: CPT

## 2024-06-19 PROCEDURE — 83036 HEMOGLOBIN GLYCOSYLATED A1C: CPT

## 2024-06-19 PROCEDURE — 85025 COMPLETE CBC W/AUTO DIFF WBC: CPT

## 2024-06-19 PROCEDURE — 81001 URINALYSIS AUTO W/SCOPE: CPT

## 2024-06-19 PROCEDURE — 87086 URINE CULTURE/COLONY COUNT: CPT

## 2024-06-19 PROCEDURE — 84443 ASSAY THYROID STIM HORMONE: CPT

## 2024-06-19 PROCEDURE — 80053 COMPREHEN METABOLIC PANEL: CPT

## 2024-06-20 LAB
BACTERIA SPEC CULT: NORMAL
SERVICE CMNT-IMP: NORMAL

## 2024-07-02 ENCOUNTER — HOSPITAL ENCOUNTER (OUTPATIENT)
Facility: HOSPITAL | Age: 54
Discharge: HOME | End: 2024-07-05
Payer: COMMERCIAL

## 2024-07-02 VITALS — HEIGHT: 62 IN | BODY MASS INDEX: 50.87 KG/M2

## 2024-07-02 DIAGNOSIS — Z12.31 VISIT FOR SCREENING MAMMOGRAM: ICD-10-CM

## 2024-07-02 PROCEDURE — 77063 BREAST TOMOSYNTHESIS BI: CPT

## 2024-07-03 ENCOUNTER — OFFICE VISIT (OUTPATIENT)
Facility: CLINIC | Age: 54
End: 2024-07-03
Payer: COMMERCIAL

## 2024-07-03 ENCOUNTER — HOSPITAL ENCOUNTER (OUTPATIENT)
Facility: HOSPITAL | Age: 54
Setting detail: SPECIMEN
Discharge: HOME | End: 2024-07-06
Payer: COMMERCIAL

## 2024-07-03 VITALS
HEART RATE: 92 BPM | SYSTOLIC BLOOD PRESSURE: 100 MMHG | WEIGHT: 276.4 LBS | HEIGHT: 62 IN | DIASTOLIC BLOOD PRESSURE: 57 MMHG | OXYGEN SATURATION: 96 % | RESPIRATION RATE: 20 BRPM | BODY MASS INDEX: 50.86 KG/M2 | TEMPERATURE: 98.1 F

## 2024-07-03 DIAGNOSIS — Z12.11 COLON CANCER SCREENING: ICD-10-CM

## 2024-07-03 DIAGNOSIS — N89.8 VAGINAL DISCHARGE: ICD-10-CM

## 2024-07-03 DIAGNOSIS — Z23 IMMUNIZATION DUE: ICD-10-CM

## 2024-07-03 DIAGNOSIS — N89.8 VAGINAL DISCHARGE: Primary | ICD-10-CM

## 2024-07-03 PROCEDURE — 87661 TRICHOMONAS VAGINALIS AMPLIF: CPT

## 2024-07-03 PROCEDURE — 3074F SYST BP LT 130 MM HG: CPT | Performed by: NURSE PRACTITIONER

## 2024-07-03 PROCEDURE — 87491 CHLMYD TRACH DNA AMP PROBE: CPT

## 2024-07-03 PROCEDURE — 87801 DETECT AGNT MULT DNA AMPLI: CPT

## 2024-07-03 PROCEDURE — 3078F DIAST BP <80 MM HG: CPT | Performed by: NURSE PRACTITIONER

## 2024-07-03 PROCEDURE — 87591 N.GONORRHOEAE DNA AMP PROB: CPT

## 2024-07-03 PROCEDURE — 90750 HZV VACC RECOMBINANT IM: CPT | Performed by: NURSE PRACTITIONER

## 2024-07-03 PROCEDURE — 90471 IMMUNIZATION ADMIN: CPT | Performed by: NURSE PRACTITIONER

## 2024-07-03 PROCEDURE — 99213 OFFICE O/P EST LOW 20 MIN: CPT | Performed by: NURSE PRACTITIONER

## 2024-07-03 PROCEDURE — 87798 DETECT AGENT NOS DNA AMP: CPT

## 2024-07-03 PROCEDURE — 99396 PREV VISIT EST AGE 40-64: CPT | Performed by: NURSE PRACTITIONER

## 2024-07-03 ASSESSMENT — ENCOUNTER SYMPTOMS
VOMITING: 0
COUGH: 0
CHEST TIGHTNESS: 0
NAUSEA: 0
ABDOMINAL PAIN: 0
SHORTNESS OF BREATH: 0

## 2024-07-03 NOTE — PROGRESS NOTES
1. \"Have you been to the ER, urgent care clinic since your last visit?  Hospitalized since your last visit?\" No     2. \"Have you seen or consulted any other health care providers outside of the [facility] since your last visit?\" No      3. For patients aged 45-75: Has the patient had a colonoscopy / FIT/ Cologuard? No        If the patient is female:     4. For patients aged 40-74: Has the patient had a mammogram within the past 2 years? No        5. For patients aged 21-65: Has the patient had a pap smear? Yes - Care Gap present. Most recent result on file  
effects are experienced.     Please note that this dictation was completed with [employer], the computer voice recognition software. Quite often unanticipated grammatical, syntax, homophones, and other interpretive errors are inadvertently transcribed by the computer software. Please disregard these errors. Please excuse any errors that have escaped final proofreading.    An electronic signature was used to authenticate this note.          --NIA Garay NP on 7/5/2024 at 4:56 PM

## 2024-07-03 NOTE — PATIENT INSTRUCTIONS
always ask your healthcare professional. [employer] disclaims any warranty or liability for your use of this information.

## 2024-07-07 LAB
A VAGINAE DNA VAG QL NAA+PROBE: NORMAL SCORE
BVAB2 DNA VAG QL NAA+PROBE: NORMAL SCORE
C ALBICANS DNA VAG QL NAA+PROBE: NORMAL
C GLABRATA DNA VAG QL NAA+PROBE: NORMAL
C TRACH RRNA SPEC QL NAA+PROBE: NEGATIVE
CANDIDA KRUSEI: NORMAL
CANDIDA LUSITANIAE, NAA: NORMAL
CANDIDA PARAPSILOSIS/TROPICALIS: NORMAL
MEGA1 DNA VAG QL NAA+PROBE: NORMAL SCORE
N GONORRHOEA RRNA SPEC QL NAA+PROBE: NEGATIVE
T VAGINALIS RRNA SPEC QL NAA+PROBE: NEGATIVE

## 2024-07-08 LAB
A VAGINAE DNA VAG QL NAA+PROBE: ABNORMAL SCORE
BVAB2 DNA VAG QL NAA+PROBE: ABNORMAL SCORE
C ALBICANS DNA VAG QL NAA+PROBE: NEGATIVE
C GLABRATA DNA VAG QL NAA+PROBE: NEGATIVE
C TRACH RRNA SPEC QL NAA+PROBE: NEGATIVE
CANDIDA KRUSEI: NEGATIVE
CANDIDA LUSITANIAE, NAA: NEGATIVE
CANDIDA PARAPSILOSIS/TROPICALIS: NEGATIVE
MEGA1 DNA VAG QL NAA+PROBE: ABNORMAL SCORE
N GONORRHOEA RRNA SPEC QL NAA+PROBE: NEGATIVE
T VAGINALIS RRNA SPEC QL NAA+PROBE: NEGATIVE

## 2024-07-12 DIAGNOSIS — N76.0 BACTERIAL VAGINOSIS: Primary | ICD-10-CM

## 2024-07-12 DIAGNOSIS — B96.89 BACTERIAL VAGINOSIS: Primary | ICD-10-CM

## 2024-07-12 RX ORDER — METRONIDAZOLE 500 MG/1
500 TABLET ORAL 2 TIMES DAILY
Qty: 14 TABLET | Refills: 0 | Status: SHIPPED | OUTPATIENT
Start: 2024-07-12 | End: 2024-07-19

## 2024-12-20 DIAGNOSIS — I10 ESSENTIAL HYPERTENSION: ICD-10-CM

## 2024-12-20 NOTE — TELEPHONE ENCOUNTER
Last Visit: 07/03/24   Next Appointment: 01/15/25  Previous Refill Encounter: 06/12/24 #90 with 1 refills

## 2024-12-23 RX ORDER — LOSARTAN POTASSIUM AND HYDROCHLOROTHIAZIDE 12.5; 5 MG/1; MG/1
1 TABLET ORAL DAILY
Qty: 90 TABLET | Refills: 1 | Status: SHIPPED | OUTPATIENT
Start: 2024-12-23

## 2025-01-15 ENCOUNTER — HOSPITAL ENCOUNTER (OUTPATIENT)
Facility: HOSPITAL | Age: 55
Setting detail: SPECIMEN
Discharge: HOME | End: 2025-01-18
Payer: COMMERCIAL

## 2025-01-15 DIAGNOSIS — R35.0 URINE FREQUENCY: ICD-10-CM

## 2025-01-15 DIAGNOSIS — R39.15 URINARY URGENCY: ICD-10-CM

## 2025-01-15 LAB
EST. AVERAGE GLUCOSE BLD GHB EST-MCNC: 108 MG/DL
HBA1C MFR BLD: 5.4 % (ref 4.2–5.6)

## 2025-01-15 PROCEDURE — 36415 COLL VENOUS BLD VENIPUNCTURE: CPT

## 2025-01-15 PROCEDURE — 83036 HEMOGLOBIN GLYCOSYLATED A1C: CPT

## 2025-01-29 ENCOUNTER — HOSPITAL ENCOUNTER (OUTPATIENT)
Facility: HOSPITAL | Age: 55
Setting detail: SPECIMEN
Discharge: HOME | End: 2025-02-01
Payer: COMMERCIAL

## 2025-01-29 ENCOUNTER — OFFICE VISIT (OUTPATIENT)
Facility: CLINIC | Age: 55
End: 2025-01-29

## 2025-01-29 VITALS — HEART RATE: 85 BPM | HEIGHT: 62 IN | BODY MASS INDEX: 50.61 KG/M2 | OXYGEN SATURATION: 96 % | WEIGHT: 275 LBS

## 2025-01-29 DIAGNOSIS — E66.01 MORBID OBESITY WITH BODY MASS INDEX (BMI) GREATER THAN OR EQUAL TO 50: ICD-10-CM

## 2025-01-29 DIAGNOSIS — E66.01 MORBID OBESITY WITH BMI OF 40.0-44.9, ADULT: ICD-10-CM

## 2025-01-29 DIAGNOSIS — R82.90 CLOUDY URINE: Primary | ICD-10-CM

## 2025-01-29 DIAGNOSIS — R73.03 PREDIABETES: ICD-10-CM

## 2025-01-29 DIAGNOSIS — R82.90 CLOUDY URINE: ICD-10-CM

## 2025-01-29 DIAGNOSIS — Z11.3 SCREENING EXAMINATION FOR STD (SEXUALLY TRANSMITTED DISEASE): ICD-10-CM

## 2025-01-29 DIAGNOSIS — Z23 IMMUNIZATION DUE: ICD-10-CM

## 2025-01-29 DIAGNOSIS — I10 ESSENTIAL HYPERTENSION: ICD-10-CM

## 2025-01-29 DIAGNOSIS — E78.5 HYPERLIPIDEMIA, UNSPECIFIED HYPERLIPIDEMIA TYPE: ICD-10-CM

## 2025-01-29 PROCEDURE — 87798 DETECT AGENT NOS DNA AMP: CPT

## 2025-01-29 PROCEDURE — 87491 CHLMYD TRACH DNA AMP PROBE: CPT

## 2025-01-29 PROCEDURE — 87661 TRICHOMONAS VAGINALIS AMPLIF: CPT

## 2025-01-29 PROCEDURE — 87591 N.GONORRHOEAE DNA AMP PROB: CPT

## 2025-01-29 PROCEDURE — 87801 DETECT AGNT MULT DNA AMPLI: CPT

## 2025-01-29 PROCEDURE — 87563 M. GENITALIUM AMP PROBE: CPT

## 2025-01-29 ASSESSMENT — ENCOUNTER SYMPTOMS
COUGH: 0
CHEST TIGHTNESS: 0
NAUSEA: 0
SHORTNESS OF BREATH: 0
ABDOMINAL PAIN: 0
VOMITING: 0

## 2025-01-29 ASSESSMENT — PATIENT HEALTH QUESTIONNAIRE - PHQ9
SUM OF ALL RESPONSES TO PHQ QUESTIONS 1-9: 0
SUM OF ALL RESPONSES TO PHQ9 QUESTIONS 1 & 2: 0
SUM OF ALL RESPONSES TO PHQ QUESTIONS 1-9: 0
SUM OF ALL RESPONSES TO PHQ QUESTIONS 1-9: 0
1. LITTLE INTEREST OR PLEASURE IN DOING THINGS: NOT AT ALL
2. FEELING DOWN, DEPRESSED OR HOPELESS: NOT AT ALL
SUM OF ALL RESPONSES TO PHQ QUESTIONS 1-9: 0

## 2025-01-29 NOTE — PATIENT INSTRUCTIONS
Vaccine Information Statement    Influenza (Flu) Vaccine (Inactivated or Recombinant): What You Need to Know    Many vaccine information statements are available in [place] and other languages. See www.immunize.org/vis.  Hojas de información sobre vacunas están disponibles en español y en muchos otros idiomas. Visite www.immunize.org/vis.    1. Why get vaccinated?    Influenza vaccine can prevent influenza (flu).    Flu is a contagious disease that spreads around the [place] every year, usually between October and May. Anyone can get the flu, but it is more dangerous for some people. Infants and young children, people 65 years and older, pregnant people, and people with certain health conditions or a weakened immune system are at greatest risk of flu complications.    Pneumonia, bronchitis, sinus infections, and ear infections are examples of flu-related complications. If you have a medical condition, such as heart disease, cancer, or diabetes, flu can make it worse.    Flu can cause fever and chills, sore throat, muscle aches, fatigue, cough, headache, and runny or stuffy nose. Some people may have vomiting and diarrhea, though this is more common in children than adults.     In an average year, thousands of people in the [place] die from flu, and many more are hospitalized. Flu vaccine prevents millions of illnesses and flu-related visits to the doctor each year.    2. Influenza vaccines     CDC recommends everyone 6 months and older get vaccinated every flu season. Children 6 months through 8 years of age may need 2 doses during a single flu season. Everyone else needs only 1 dose each flu season.    It takes about 2 weeks for protection to develop after vaccination.    There are many flu viruses, and they are always changing. Each year a new flu vaccine is made to protect against the influenza viruses believed to be likely to cause disease in the upcoming flu season. Even when the vaccine doesn't

## 2025-01-29 NOTE — PROGRESS NOTES
\"Have you been to the ER, urgent care clinic since your last visit?  Hospitalized since your last visit?\"    NO    “Have you seen or consulted any other health care providers outside our system since your last visit?”    Yes- Ortho      “Have you had a colorectal cancer screening such as a colonoscopy/FIT/Cologuard?    NO    No colonoscopy on file  No cologuard on file  No FIT/FOBT on file   No flexible sigmoidoscopy on file            Leslie Capone denies any symptoms , reactions or allergies that would exclude them from being immunized today. Risks and adverse reactions were discussed and the most current VIS was given to them along with consent signed. All questions were addressed. Patient received influenza vaccine 0.5mL in left deltoid. Tolerated well. No signs or symptoms of distress noted. Patient left office ambulatory.  
software. Please disregard these errors. Please excuse any errors that have escaped final proofreading.    An electronic signature was used to authenticate this note.          --NIA Garya NP on 2/1/2025 at 7:05 PM

## 2025-02-02 DIAGNOSIS — N76.0 BACTERIAL VAGINITIS: Primary | ICD-10-CM

## 2025-02-02 DIAGNOSIS — B96.89 BACTERIAL VAGINITIS: Primary | ICD-10-CM

## 2025-02-02 LAB
A VAGINAE DNA VAG QL NAA+PROBE: ABNORMAL SCORE
BVAB2 DNA VAG QL NAA+PROBE: ABNORMAL SCORE
C ALBICANS DNA VAG QL NAA+PROBE: NEGATIVE
C GLABRATA DNA VAG QL NAA+PROBE: NEGATIVE
C TRACH RRNA SPEC QL NAA+PROBE: NEGATIVE
M GENITALIUM DNA SPEC QL NAA+PROBE: NEGATIVE
M HOMINIS DNA SPEC QL NAA+PROBE: NEGATIVE
MEGA1 DNA VAG QL NAA+PROBE: ABNORMAL SCORE
N GONORRHOEA RRNA SPEC QL NAA+PROBE: NEGATIVE
SPECIMEN SOURCE: ABNORMAL
T VAGINALIS RRNA SPEC QL NAA+PROBE: NEGATIVE
UREAPLASMA DNA SPEC QL NAA+PROBE: POSITIVE

## 2025-02-02 RX ORDER — DOXYCYCLINE HYCLATE 100 MG
100 TABLET ORAL 2 TIMES DAILY
Qty: 20 TABLET | Refills: 0 | Status: SHIPPED | OUTPATIENT
Start: 2025-02-02 | End: 2025-02-12

## 2025-02-03 ENCOUNTER — TELEPHONE (OUTPATIENT)
Facility: CLINIC | Age: 55
End: 2025-02-03

## 2025-02-03 NOTE — TELEPHONE ENCOUNTER
Contacted patient to proivde update in regards to recent lab results. Per NP Francisco J she will be sending in Doxycycline for resistant BV in the culture.

## 2025-02-06 ENCOUNTER — HOSPITAL ENCOUNTER (OUTPATIENT)
Facility: HOSPITAL | Age: 55
Setting detail: SPECIMEN
Discharge: HOME | End: 2025-02-09
Payer: COMMERCIAL

## 2025-02-06 DIAGNOSIS — E66.01 MORBID OBESITY WITH BODY MASS INDEX (BMI) GREATER THAN OR EQUAL TO 50: ICD-10-CM

## 2025-02-06 DIAGNOSIS — R82.90 CLOUDY URINE: ICD-10-CM

## 2025-02-06 DIAGNOSIS — R73.03 PREDIABETES: ICD-10-CM

## 2025-02-06 DIAGNOSIS — I10 ESSENTIAL HYPERTENSION: ICD-10-CM

## 2025-02-06 DIAGNOSIS — E78.5 HYPERLIPIDEMIA, UNSPECIFIED HYPERLIPIDEMIA TYPE: ICD-10-CM

## 2025-02-06 DIAGNOSIS — Z11.3 SCREENING EXAMINATION FOR STD (SEXUALLY TRANSMITTED DISEASE): ICD-10-CM

## 2025-02-06 LAB
ALBUMIN SERPL-MCNC: 3.6 G/DL (ref 3.4–5)
ALBUMIN/GLOB SERPL: 0.9 (ref 0.8–1.7)
ALP SERPL-CCNC: 62 U/L (ref 45–117)
ALT SERPL-CCNC: 26 U/L (ref 13–56)
ANION GAP SERPL CALC-SCNC: 4 MMOL/L (ref 3–18)
APPEARANCE UR: ABNORMAL
AST SERPL-CCNC: 14 U/L (ref 10–38)
BACTERIA URNS QL MICRO: ABNORMAL /HPF
BASOPHILS # BLD: 0.02 K/UL (ref 0–0.1)
BASOPHILS NFR BLD: 0.4 % (ref 0–2)
BILIRUB SERPL-MCNC: 0.4 MG/DL (ref 0.2–1)
BILIRUB UR QL: NEGATIVE
BUN SERPL-MCNC: 10 MG/DL (ref 7–18)
BUN/CREAT SERPL: 12 (ref 12–20)
CALCIUM SERPL-MCNC: 9 MG/DL (ref 8.5–10.1)
CHLORIDE SERPL-SCNC: 108 MMOL/L (ref 100–111)
CHOLEST SERPL-MCNC: 202 MG/DL
CO2 SERPL-SCNC: 28 MMOL/L (ref 21–32)
COLOR UR: YELLOW
CREAT SERPL-MCNC: 0.81 MG/DL (ref 0.6–1.3)
DIFFERENTIAL METHOD BLD: ABNORMAL
EOSINOPHIL # BLD: 0.16 K/UL (ref 0–0.4)
EOSINOPHIL NFR BLD: 3.3 % (ref 0–5)
EPITH CASTS URNS QL MICRO: ABNORMAL /LPF (ref 0–5)
ERYTHROCYTE [DISTWIDTH] IN BLOOD BY AUTOMATED COUNT: 14.7 % (ref 11.6–14.5)
GLOBULIN SER CALC-MCNC: 3.8 G/DL (ref 2–4)
GLUCOSE SERPL-MCNC: 104 MG/DL (ref 74–99)
GLUCOSE UR STRIP.AUTO-MCNC: NEGATIVE MG/DL
HCT VFR BLD AUTO: 41.1 % (ref 35–45)
HDLC SERPL-MCNC: 70 MG/DL (ref 40–60)
HDLC SERPL: 2.9 (ref 0–5)
HGB BLD-MCNC: 11.7 G/DL (ref 12–16)
HGB UR QL STRIP: NEGATIVE
IMM GRANULOCYTES # BLD AUTO: 0.01 K/UL (ref 0–0.04)
IMM GRANULOCYTES NFR BLD AUTO: 0.2 % (ref 0–0.5)
KETONES UR QL STRIP.AUTO: NEGATIVE MG/DL
LDLC SERPL CALC-MCNC: 118.4 MG/DL (ref 0–100)
LEUKOCYTE ESTERASE UR QL STRIP.AUTO: ABNORMAL
LIPID PANEL: ABNORMAL
LYMPHOCYTES # BLD: 1.95 K/UL (ref 0.9–3.6)
LYMPHOCYTES NFR BLD: 40.4 % (ref 21–52)
MCH RBC QN AUTO: 26.1 PG (ref 24–34)
MCHC RBC AUTO-ENTMCNC: 28.5 G/DL (ref 31–37)
MCV RBC AUTO: 91.5 FL (ref 78–100)
MONOCYTES # BLD: 0.4 K/UL (ref 0.05–1.2)
MONOCYTES NFR BLD: 8.3 % (ref 3–10)
MUCOUS THREADS URNS QL MICRO: ABNORMAL /LPF
NEUTS SEG # BLD: 2.29 K/UL (ref 1.8–8)
NEUTS SEG NFR BLD: 47.4 % (ref 40–73)
NITRITE UR QL STRIP.AUTO: NEGATIVE
NRBC # BLD: 0 K/UL (ref 0–0.01)
NRBC BLD-RTO: 0 PER 100 WBC
PH UR STRIP: 5.5 (ref 5–8)
PLATELET # BLD AUTO: 232 K/UL (ref 135–420)
PMV BLD AUTO: 12.3 FL (ref 9.2–11.8)
POTASSIUM SERPL-SCNC: 3.8 MMOL/L (ref 3.5–5.5)
PROT SERPL-MCNC: 7.4 G/DL (ref 6.4–8.2)
PROT UR STRIP-MCNC: NEGATIVE MG/DL
RBC # BLD AUTO: 4.49 M/UL (ref 4.2–5.3)
RBC #/AREA URNS HPF: ABNORMAL /HPF (ref 0–5)
SODIUM SERPL-SCNC: 140 MMOL/L (ref 136–145)
SP GR UR REFRACTOMETRY: 1.02 (ref 1–1.03)
TRIGL SERPL-MCNC: 68 MG/DL
UROBILINOGEN UR QL STRIP.AUTO: 0.2 EU/DL (ref 0.2–1)
VLDLC SERPL CALC-MCNC: 13.6 MG/DL
WBC # BLD AUTO: 4.8 K/UL (ref 4.6–13.2)
WBC URNS QL MICRO: ABNORMAL /HPF (ref 0–5)

## 2025-02-06 PROCEDURE — 80053 COMPREHEN METABOLIC PANEL: CPT

## 2025-02-06 PROCEDURE — 86780 TREPONEMA PALLIDUM: CPT

## 2025-02-06 PROCEDURE — 36415 COLL VENOUS BLD VENIPUNCTURE: CPT

## 2025-02-06 PROCEDURE — 87350 HEPATITIS BE AG IA: CPT

## 2025-02-06 PROCEDURE — 80061 LIPID PANEL: CPT

## 2025-02-06 PROCEDURE — 81001 URINALYSIS AUTO W/SCOPE: CPT

## 2025-02-06 PROCEDURE — 85025 COMPLETE CBC W/AUTO DIFF WBC: CPT

## 2025-02-06 PROCEDURE — 87389 HIV-1 AG W/HIV-1&-2 AB AG IA: CPT

## 2025-02-07 LAB
HIV 1+2 AB+HIV1 P24 AG SERPL QL IA: NONREACTIVE
HIV 1/2 RESULT COMMENT: NORMAL
T PALLIDUM AB SER QL IA: NONREACTIVE

## 2025-02-09 LAB — HBV E AG SERPL QL IA: NEGATIVE

## 2025-02-12 ENCOUNTER — OFFICE VISIT (OUTPATIENT)
Facility: CLINIC | Age: 55
End: 2025-02-12
Payer: COMMERCIAL

## 2025-02-12 VITALS
WEIGHT: 271.6 LBS | SYSTOLIC BLOOD PRESSURE: 150 MMHG | HEIGHT: 62 IN | DIASTOLIC BLOOD PRESSURE: 83 MMHG | HEART RATE: 76 BPM | BODY MASS INDEX: 49.98 KG/M2 | OXYGEN SATURATION: 94 %

## 2025-02-12 DIAGNOSIS — Z71.2 ENCOUNTER TO DISCUSS TEST RESULTS: ICD-10-CM

## 2025-02-12 DIAGNOSIS — E78.5 HYPERLIPIDEMIA, UNSPECIFIED HYPERLIPIDEMIA TYPE: ICD-10-CM

## 2025-02-12 DIAGNOSIS — I10 ESSENTIAL HYPERTENSION: Primary | ICD-10-CM

## 2025-02-12 DIAGNOSIS — E66.01 MORBID OBESITY WITH BMI OF 40.0-44.9, ADULT: ICD-10-CM

## 2025-02-12 PROCEDURE — 3079F DIAST BP 80-89 MM HG: CPT | Performed by: NURSE PRACTITIONER

## 2025-02-12 PROCEDURE — 99213 OFFICE O/P EST LOW 20 MIN: CPT | Performed by: NURSE PRACTITIONER

## 2025-02-12 PROCEDURE — 3077F SYST BP >= 140 MM HG: CPT | Performed by: NURSE PRACTITIONER

## 2025-02-12 RX ORDER — HYDROCHLOROTHIAZIDE 12.5 MG/1
12.5 CAPSULE ORAL EVERY MORNING
Qty: 90 CAPSULE | Refills: 1 | Status: SHIPPED | OUTPATIENT
Start: 2025-02-12

## 2025-02-12 SDOH — ECONOMIC STABILITY: FOOD INSECURITY: WITHIN THE PAST 12 MONTHS, YOU WORRIED THAT YOUR FOOD WOULD RUN OUT BEFORE YOU GOT MONEY TO BUY MORE.: NEVER TRUE

## 2025-02-12 SDOH — ECONOMIC STABILITY: FOOD INSECURITY: WITHIN THE PAST 12 MONTHS, THE FOOD YOU BOUGHT JUST DIDN'T LAST AND YOU DIDN'T HAVE MONEY TO GET MORE.: NEVER TRUE

## 2025-02-12 ASSESSMENT — PATIENT HEALTH QUESTIONNAIRE - PHQ9
SUM OF ALL RESPONSES TO PHQ QUESTIONS 1-9: 5
SUM OF ALL RESPONSES TO PHQ QUESTIONS 1-9: 5
SUM OF ALL RESPONSES TO PHQ9 QUESTIONS 1 & 2: 0
4. FEELING TIRED OR HAVING LITTLE ENERGY: NEARLY EVERY DAY
9. THOUGHTS THAT YOU WOULD BE BETTER OFF DEAD, OR OF HURTING YOURSELF: NOT AT ALL
2. FEELING DOWN, DEPRESSED OR HOPELESS: NOT AT ALL
6. FEELING BAD ABOUT YOURSELF - OR THAT YOU ARE A FAILURE OR HAVE LET YOURSELF OR YOUR FAMILY DOWN: NOT AT ALL
5. POOR APPETITE OR OVEREATING: MORE THAN HALF THE DAYS
SUM OF ALL RESPONSES TO PHQ QUESTIONS 1-9: 5
1. LITTLE INTEREST OR PLEASURE IN DOING THINGS: NOT AT ALL
SUM OF ALL RESPONSES TO PHQ QUESTIONS 1-9: 5
7. TROUBLE CONCENTRATING ON THINGS, SUCH AS READING THE NEWSPAPER OR WATCHING TELEVISION: NOT AT ALL
8. MOVING OR SPEAKING SO SLOWLY THAT OTHER PEOPLE COULD HAVE NOTICED. OR THE OPPOSITE, BEING SO FIGETY OR RESTLESS THAT YOU HAVE BEEN MOVING AROUND A LOT MORE THAN USUAL: NOT AT ALL
10. IF YOU CHECKED OFF ANY PROBLEMS, HOW DIFFICULT HAVE THESE PROBLEMS MADE IT FOR YOU TO DO YOUR WORK, TAKE CARE OF THINGS AT HOME, OR GET ALONG WITH OTHER PEOPLE: NOT DIFFICULT AT ALL
3. TROUBLE FALLING OR STAYING ASLEEP: NOT AT ALL

## 2025-02-12 ASSESSMENT — ENCOUNTER SYMPTOMS
ABDOMINAL PAIN: 0
NAUSEA: 0
VOMITING: 0
CHEST TIGHTNESS: 0
SHORTNESS OF BREATH: 0
COUGH: 0

## 2025-02-12 NOTE — PROGRESS NOTES
Leslie Capone (:  1970) is a 54 y.o. female, here for evaluation of the following medical concerns:  No chief complaint on file.        ASSESSMENT/PLAN:    1. Essential hypertension- added to current treatment plan  -     hydroCHLOROthiazide 12.5 MG capsule; Take 1 capsule by mouth every morning, Disp-90 capsule, R-1Normal  -     Lipid Panel; Future  -     Basic Metabolic Panel; Future  2. Morbid obesity with BMI of 40.0-44.9, adult- discussed need to call insurance to find out what is covered under insurance plan for GLP1 therapy, she has had some weight loss since 2024 of 278lbs today at 271lbs  -     Lipid Panel; Future  -     Basic Metabolic Panel; Future  3. Hyperlipidemia, unspecified hyperlipidemia type- diet/exercise managment  -     Lipid Panel; Future  -     Basic Metabolic Panel; Future  4. Encounter to discuss test results   Provided colo referral and mammogram robert number  Return in about 3 months (around 2025) for [id number], HTN,,HPL.        HPI  She is here today for lab review and glp1 therapy f/u- I have asked her to call the pharmacy    Elevated bp in office today- added hctz 12.5 mg     Bv positive- doxycycline  She is still having regular periods -- lasting 3 days    The 10-year ASCVD risk score (Ta DK, et al., 2019) is: 6.3%    Values used to calculate the score:      Age: 54 years      Sex: Female      Is Non- : Yes      Diabetic: No      Tobacco smoker: No      Systolic Blood Pressure: 150 mmHg      Is BP treated: Yes      HDL Cholesterol: 70 MG/DL      Total Cholesterol: 202 MG/DL      Last visit-   Obesity- exercise- walking- try 30 mins, stationary bike 10 mins- 3 times a week, stretching and squats, walking at work. She is working [employer]. Diet stopped soda, does not eat sweets, no bread. States her insurance does not pay for surgical weight loss programs. Not snacking late, not a snacker. If working eating three meals a day,

## 2025-02-25 ENCOUNTER — COMMUNITY OUTREACH (OUTPATIENT)
Facility: CLINIC | Age: 55
End: 2025-02-25

## 2025-06-20 ENCOUNTER — TRANSCRIBE ORDERS (OUTPATIENT)
Dept: SCHEDULING | Age: 55
End: 2025-06-20

## 2025-06-20 DIAGNOSIS — Z12.31 OTHER SCREENING MAMMOGRAM: Primary | ICD-10-CM

## 2025-06-30 ENCOUNTER — HOSPITAL ENCOUNTER (OUTPATIENT)
Facility: HOSPITAL | Age: 55
Setting detail: SPECIMEN
Discharge: HOME | End: 2025-07-03
Payer: COMMERCIAL

## 2025-06-30 ENCOUNTER — OFFICE VISIT (OUTPATIENT)
Facility: CLINIC | Age: 55
End: 2025-06-30
Payer: COMMERCIAL

## 2025-06-30 VITALS
BODY MASS INDEX: 52.37 KG/M2 | HEART RATE: 89 BPM | OXYGEN SATURATION: 98 % | WEIGHT: 284.6 LBS | DIASTOLIC BLOOD PRESSURE: 70 MMHG | SYSTOLIC BLOOD PRESSURE: 120 MMHG | HEIGHT: 62 IN

## 2025-06-30 DIAGNOSIS — B96.89 BACTERIAL VAGINOSIS: Primary | ICD-10-CM

## 2025-06-30 DIAGNOSIS — M79.89 LEG SWELLING: ICD-10-CM

## 2025-06-30 DIAGNOSIS — E78.5 HYPERLIPIDEMIA, UNSPECIFIED HYPERLIPIDEMIA TYPE: ICD-10-CM

## 2025-06-30 DIAGNOSIS — N76.0 BACTERIAL VAGINOSIS: Primary | ICD-10-CM

## 2025-06-30 DIAGNOSIS — R01.1 NEWLY RECOGNIZED HEART MURMUR: ICD-10-CM

## 2025-06-30 DIAGNOSIS — B96.89 BACTERIAL VAGINOSIS: ICD-10-CM

## 2025-06-30 DIAGNOSIS — N76.0 BACTERIAL VAGINOSIS: ICD-10-CM

## 2025-06-30 DIAGNOSIS — I10 ESSENTIAL HYPERTENSION: ICD-10-CM

## 2025-06-30 DIAGNOSIS — E66.01 MORBID OBESITY WITH BMI OF 40.0-44.9, ADULT (HCC): ICD-10-CM

## 2025-06-30 LAB
ALBUMIN SERPL-MCNC: 3.3 G/DL (ref 3.4–5)
ALBUMIN/GLOB SERPL: 1 (ref 0.8–1.7)
ALP SERPL-CCNC: 58 U/L (ref 45–117)
ALT SERPL-CCNC: 21 U/L (ref 10–35)
ANION GAP SERPL CALC-SCNC: 12 MMOL/L (ref 3–18)
AST SERPL-CCNC: 21 U/L (ref 10–38)
BILIRUB SERPL-MCNC: 0.2 MG/DL (ref 0.2–1)
BUN SERPL-MCNC: 15 MG/DL (ref 6–23)
BUN/CREAT SERPL: 17 (ref 12–20)
CALCIUM SERPL-MCNC: 9.2 MG/DL (ref 8.5–10.1)
CHLORIDE SERPL-SCNC: 105 MMOL/L (ref 98–107)
CHOLEST SERPL-MCNC: 204 MG/DL
CO2 SERPL-SCNC: 25 MMOL/L (ref 21–32)
CREAT SERPL-MCNC: 0.86 MG/DL (ref 0.6–1.3)
GLOBULIN SER CALC-MCNC: 3.3 G/DL (ref 2–4)
GLUCOSE SERPL-MCNC: 108 MG/DL (ref 74–108)
HDLC SERPL-MCNC: 52 MG/DL (ref 40–60)
HDLC SERPL: 3.9 (ref 0–5)
LDLC SERPL CALC-MCNC: 137 MG/DL (ref 0–100)
NT PRO BNP: <36 PG/ML (ref 36–900)
POTASSIUM SERPL-SCNC: 3.5 MMOL/L (ref 3.5–5.5)
PROT SERPL-MCNC: 6.6 G/DL (ref 6.4–8.2)
SODIUM SERPL-SCNC: 141 MMOL/L (ref 136–145)
TRIGL SERPL-MCNC: 73 MG/DL (ref 0–150)
VLDLC SERPL CALC-MCNC: 15 MG/DL

## 2025-06-30 PROCEDURE — 87563 M. GENITALIUM AMP PROBE: CPT

## 2025-06-30 PROCEDURE — 80053 COMPREHEN METABOLIC PANEL: CPT

## 2025-06-30 PROCEDURE — 87491 CHLMYD TRACH DNA AMP PROBE: CPT

## 2025-06-30 PROCEDURE — 83880 ASSAY OF NATRIURETIC PEPTIDE: CPT

## 2025-06-30 PROCEDURE — 99213 OFFICE O/P EST LOW 20 MIN: CPT | Performed by: NURSE PRACTITIONER

## 2025-06-30 PROCEDURE — 87661 TRICHOMONAS VAGINALIS AMPLIF: CPT

## 2025-06-30 PROCEDURE — 87798 DETECT AGENT NOS DNA AMP: CPT

## 2025-06-30 PROCEDURE — 87591 N.GONORRHOEAE DNA AMP PROB: CPT

## 2025-06-30 PROCEDURE — 36415 COLL VENOUS BLD VENIPUNCTURE: CPT

## 2025-06-30 PROCEDURE — 3074F SYST BP LT 130 MM HG: CPT | Performed by: NURSE PRACTITIONER

## 2025-06-30 PROCEDURE — 3078F DIAST BP <80 MM HG: CPT | Performed by: NURSE PRACTITIONER

## 2025-06-30 PROCEDURE — 87801 DETECT AGNT MULT DNA AMPLI: CPT

## 2025-06-30 PROCEDURE — 80061 LIPID PANEL: CPT

## 2025-06-30 RX ORDER — IBUPROFEN 800 MG/1
TABLET, FILM COATED ORAL
COMMUNITY

## 2025-06-30 ASSESSMENT — PATIENT HEALTH QUESTIONNAIRE - PHQ9
SUM OF ALL RESPONSES TO PHQ QUESTIONS 1-9: 0
1. LITTLE INTEREST OR PLEASURE IN DOING THINGS: NOT AT ALL
2. FEELING DOWN, DEPRESSED OR HOPELESS: NOT AT ALL
SUM OF ALL RESPONSES TO PHQ QUESTIONS 1-9: 0

## 2025-06-30 NOTE — PROGRESS NOTES
Leslie Capone (:  1970) is a 55 y.o. female, here for evaluation of the following medical concerns:  Chief Complaint   Patient presents with    Leg Swelling     Has been elevating legs, using ice, applying patches, taking ibuprofen.   Retest Urine              ASSESSMENT/PLAN:    Assessment & Plan  1. Bacterial vaginosis.  - A repeat swab will be conducted to confirm the presence of bacterial vaginosis.  - No burning or discomfort reported, but occasional cloudiness is noted.  - Advised to continue taking probiotics and prebiotics.  - Discussion on the importance of probiotics and prebiotics for gut health.    2. Leg pain.  - The leg pain is likely due to fluid accumulation rather than an inflammatory response.  - Blood pressure readings are within normal range.  - Advised to avoid excessive standing and sitting, use compression stockings, maintain a daily water intake of at least 100 ounces, limit salt intake, elevate her legs whenever possible, and increase physical activity.  - Laboratory tests will be ordered to assess kidney function and a BNP test will be conducted to rule out any cardiac issues.    3. Arm discomfort.  - The arm discomfort could be attributed to overuse syndrome, epicondylitis, or carpal tunnel syndrome.  - No specific physical exam findings discussed.  - Advised to monitor symptoms and avoid overusing the arm.  - No specific treatment prescribed at this time.    4. Health maintenance.  - A referral to a cardiologist will be made for further evaluation of her heart murmur.  - Advised to call her insurance to check the co-pay and follow up with the cardiologist to ensure it is safe to proceed with the colonoscopy.  - Discussion on the importance of addressing the heart murmur before undergoing anesthesia for the colonoscopy.  - Encouraged to follow up on the referral and insurance details to avoid further delays in necessary procedures.  Bacterial vaginosis  -     [facility]

## 2025-07-01 ENCOUNTER — RESULTS FOLLOW-UP (OUTPATIENT)
Facility: CLINIC | Age: 55
End: 2025-07-01

## 2025-07-01 NOTE — TELEPHONE ENCOUNTER
----- Message from NIA Mckinney NP sent at 7/1/2025  7:56 AM EDT -----  Labs do not show fluid concerns around the heart, no kidney or liver concerns or electrolyte changes.

## 2025-07-29 ENCOUNTER — HOSPITAL ENCOUNTER (OUTPATIENT)
Facility: HOSPITAL | Age: 55
Discharge: HOME | End: 2025-08-01
Payer: COMMERCIAL

## 2025-07-29 VITALS — WEIGHT: 279 LBS | HEIGHT: 62 IN | BODY MASS INDEX: 51.34 KG/M2

## 2025-07-29 DIAGNOSIS — Z12.31 OTHER SCREENING MAMMOGRAM: ICD-10-CM

## 2025-07-29 PROCEDURE — 77063 BREAST TOMOSYNTHESIS BI: CPT
